# Patient Record
Sex: FEMALE | Employment: OTHER | URBAN - METROPOLITAN AREA
[De-identification: names, ages, dates, MRNs, and addresses within clinical notes are randomized per-mention and may not be internally consistent; named-entity substitution may affect disease eponyms.]

---

## 2017-01-01 ENCOUNTER — HOME CARE VISIT (OUTPATIENT)
Dept: HOME HEALTH SERVICES | Facility: HOME HEALTH | Age: 82
End: 2017-01-01
Payer: MEDICARE

## 2017-01-01 ENCOUNTER — HOME CARE VISIT (OUTPATIENT)
Dept: HOSPICE | Facility: HOSPICE | Age: 82
End: 2017-01-01
Payer: MEDICARE

## 2017-01-01 ENCOUNTER — HOME CARE VISIT (OUTPATIENT)
Dept: SCHEDULING | Facility: HOME HEALTH | Age: 82
End: 2017-01-01
Payer: MEDICARE

## 2017-01-01 ENCOUNTER — OFFICE VISIT (OUTPATIENT)
Dept: ONCOLOGY | Age: 82
End: 2017-01-01

## 2017-01-01 ENCOUNTER — HOSPITAL ENCOUNTER (INPATIENT)
Age: 82
LOS: 1 days | Discharge: HOSPICE | DRG: 871 | End: 2017-03-06
Attending: EMERGENCY MEDICINE | Admitting: INTERNAL MEDICINE
Payer: MEDICARE

## 2017-01-01 ENCOUNTER — APPOINTMENT (OUTPATIENT)
Dept: CT IMAGING | Age: 82
DRG: 871 | End: 2017-01-01
Attending: EMERGENCY MEDICINE
Payer: MEDICARE

## 2017-01-01 ENCOUNTER — APPOINTMENT (OUTPATIENT)
Dept: GENERAL RADIOLOGY | Age: 82
DRG: 871 | End: 2017-01-01
Attending: EMERGENCY MEDICINE
Payer: MEDICARE

## 2017-01-01 ENCOUNTER — HOSPICE ADMISSION (OUTPATIENT)
Dept: HOSPICE | Facility: HOSPICE | Age: 82
End: 2017-01-01
Payer: MEDICARE

## 2017-01-01 ENCOUNTER — HOSPITAL ENCOUNTER (INPATIENT)
Age: 82
LOS: 12 days | DRG: 871 | End: 2017-03-18
Attending: INTERNAL MEDICINE | Admitting: INTERNAL MEDICINE
Payer: OTHER MISCELLANEOUS

## 2017-01-01 VITALS
HEIGHT: 62 IN | RESPIRATION RATE: 16 BRPM | SYSTOLIC BLOOD PRESSURE: 142 MMHG | OXYGEN SATURATION: 94 % | HEART RATE: 74 BPM | DIASTOLIC BLOOD PRESSURE: 72 MMHG | TEMPERATURE: 97.1 F

## 2017-01-01 VITALS
SYSTOLIC BLOOD PRESSURE: 112 MMHG | TEMPERATURE: 98.4 F | DIASTOLIC BLOOD PRESSURE: 60 MMHG | RESPIRATION RATE: 14 BRPM | HEART RATE: 65 BPM | OXYGEN SATURATION: 94 %

## 2017-01-01 VITALS
DIASTOLIC BLOOD PRESSURE: 62 MMHG | HEART RATE: 76 BPM | SYSTOLIC BLOOD PRESSURE: 122 MMHG | TEMPERATURE: 98.3 F | RESPIRATION RATE: 18 BRPM

## 2017-01-01 VITALS
TEMPERATURE: 97.7 F | SYSTOLIC BLOOD PRESSURE: 122 MMHG | HEART RATE: 63 BPM | OXYGEN SATURATION: 93 % | DIASTOLIC BLOOD PRESSURE: 62 MMHG | RESPIRATION RATE: 18 BRPM

## 2017-01-01 VITALS
DIASTOLIC BLOOD PRESSURE: 70 MMHG | RESPIRATION RATE: 14 BRPM | SYSTOLIC BLOOD PRESSURE: 142 MMHG | HEART RATE: 60 BPM | OXYGEN SATURATION: 94 % | TEMPERATURE: 97.1 F

## 2017-01-01 VITALS
DIASTOLIC BLOOD PRESSURE: 72 MMHG | SYSTOLIC BLOOD PRESSURE: 130 MMHG | RESPIRATION RATE: 14 BRPM | HEART RATE: 70 BPM | OXYGEN SATURATION: 97 % | TEMPERATURE: 96.9 F

## 2017-01-01 VITALS
OXYGEN SATURATION: 95 % | DIASTOLIC BLOOD PRESSURE: 40 MMHG | WEIGHT: 117.73 LBS | BODY MASS INDEX: 21.66 KG/M2 | HEART RATE: 105 BPM | HEIGHT: 62 IN | RESPIRATION RATE: 24 BRPM | SYSTOLIC BLOOD PRESSURE: 75 MMHG | TEMPERATURE: 98.6 F

## 2017-01-01 VITALS
OXYGEN SATURATION: 89 % | DIASTOLIC BLOOD PRESSURE: 41 MMHG | RESPIRATION RATE: 38 BRPM | TEMPERATURE: 98.2 F | SYSTOLIC BLOOD PRESSURE: 77 MMHG | HEART RATE: 86 BPM

## 2017-01-01 VITALS
SYSTOLIC BLOOD PRESSURE: 108 MMHG | TEMPERATURE: 98.2 F | OXYGEN SATURATION: 95 % | RESPIRATION RATE: 16 BRPM | DIASTOLIC BLOOD PRESSURE: 50 MMHG | HEART RATE: 45 BPM

## 2017-01-01 VITALS
TEMPERATURE: 98.6 F | RESPIRATION RATE: 16 BRPM | SYSTOLIC BLOOD PRESSURE: 122 MMHG | HEART RATE: 61 BPM | OXYGEN SATURATION: 96 % | DIASTOLIC BLOOD PRESSURE: 56 MMHG

## 2017-01-01 VITALS
RESPIRATION RATE: 16 BRPM | SYSTOLIC BLOOD PRESSURE: 122 MMHG | TEMPERATURE: 96.5 F | DIASTOLIC BLOOD PRESSURE: 64 MMHG | HEART RATE: 72 BPM

## 2017-01-01 VITALS
DIASTOLIC BLOOD PRESSURE: 60 MMHG | RESPIRATION RATE: 16 BRPM | OXYGEN SATURATION: 97 % | SYSTOLIC BLOOD PRESSURE: 118 MMHG | HEART RATE: 60 BPM | TEMPERATURE: 97 F

## 2017-01-01 DIAGNOSIS — D50.0 BLOOD LOSS ANEMIA: ICD-10-CM

## 2017-01-01 DIAGNOSIS — D61.89 OTHER SPECIFIED APLASTIC ANEMIAS (HCC): Primary | ICD-10-CM

## 2017-01-01 DIAGNOSIS — E87.5 ACUTE HYPERKALEMIA: ICD-10-CM

## 2017-01-01 DIAGNOSIS — D64.9 ANEMIA, UNSPECIFIED: Primary | ICD-10-CM

## 2017-01-01 DIAGNOSIS — A41.9 SEPSIS ASSOCIATED HYPOTENSION (HCC): Primary | ICD-10-CM

## 2017-01-01 DIAGNOSIS — N39.0 URINARY TRACT INFECTION, SITE UNSPECIFIED: ICD-10-CM

## 2017-01-01 DIAGNOSIS — A41.9 SEPSIS, DUE TO UNSPECIFIED ORGANISM: ICD-10-CM

## 2017-01-01 DIAGNOSIS — N17.9 ACUTE RENAL FAILURE, UNSPECIFIED ACUTE RENAL FAILURE TYPE (HCC): ICD-10-CM

## 2017-01-01 DIAGNOSIS — N17.9 ACUTE KIDNEY INJURY (HCC): ICD-10-CM

## 2017-01-01 DIAGNOSIS — L89.152 SACRAL DECUBITUS ULCER, STAGE II (HCC): ICD-10-CM

## 2017-01-01 DIAGNOSIS — I95.9 SEPSIS ASSOCIATED HYPOTENSION (HCC): Primary | ICD-10-CM

## 2017-01-01 DIAGNOSIS — E87.20 ACIDOSIS: ICD-10-CM

## 2017-01-01 LAB
ALBUMIN SERPL BCP-MCNC: 1.4 G/DL (ref 3.5–5)
ALBUMIN SERPL BCP-MCNC: 1.6 G/DL (ref 3.5–5)
ALBUMIN/GLOB SERPL: 0.3 {RATIO} (ref 1.1–2.2)
ALBUMIN/GLOB SERPL: 0.3 {RATIO} (ref 1.1–2.2)
ALP SERPL-CCNC: 89 U/L (ref 45–117)
ALP SERPL-CCNC: 91 U/L (ref 45–117)
ALT SERPL-CCNC: 42 U/L (ref 12–78)
ALT SERPL-CCNC: 46 U/L (ref 12–78)
ANION GAP BLD CALC-SCNC: 13 MMOL/L (ref 5–15)
ANION GAP BLD CALC-SCNC: 14 MMOL/L (ref 5–15)
ANION GAP BLD CALC-SCNC: 17 MMOL/L (ref 5–15)
APPEARANCE UR: ABNORMAL
AST SERPL W P-5'-P-CCNC: 47 U/L (ref 15–37)
AST SERPL W P-5'-P-CCNC: 52 U/L (ref 15–37)
ATRIAL RATE: 54 BPM
BACTERIA SPEC CULT: ABNORMAL
BACTERIA SPEC CULT: NORMAL
BACTERIA URNS QL MICRO: ABNORMAL /HPF
BASOPHILS # BLD AUTO: 0 K/UL (ref 0–0.1)
BASOPHILS # BLD AUTO: 0 K/UL (ref 0–0.1)
BASOPHILS # BLD AUTO: 0 X10E3/UL (ref 0–0.2)
BASOPHILS # BLD AUTO: 0 X10E3/UL (ref 0–0.2)
BASOPHILS # BLD: 0 % (ref 0–1)
BASOPHILS # BLD: 0 % (ref 0–1)
BASOPHILS NFR BLD AUTO: 0 %
BASOPHILS NFR BLD AUTO: 1 %
BILIRUB SERPL-MCNC: 0.3 MG/DL (ref 0.2–1)
BILIRUB SERPL-MCNC: 0.4 MG/DL (ref 0.2–1)
BILIRUB UR QL: NEGATIVE
BUN SERPL-MCNC: 104 MG/DL (ref 6–20)
BUN SERPL-MCNC: 112 MG/DL (ref 6–20)
BUN SERPL-MCNC: 121 MG/DL (ref 6–20)
BUN/CREAT SERPL: 28 (ref 12–20)
BUN/CREAT SERPL: 30 (ref 12–20)
BUN/CREAT SERPL: 31 (ref 12–20)
CALCIUM SERPL-MCNC: 7.4 MG/DL (ref 8.5–10.1)
CALCIUM SERPL-MCNC: 7.8 MG/DL (ref 8.5–10.1)
CALCIUM SERPL-MCNC: 7.9 MG/DL (ref 8.5–10.1)
CALCULATED P AXIS, ECG09: 93 DEGREES
CALCULATED R AXIS, ECG10: -52 DEGREES
CALCULATED T AXIS, ECG11: 31 DEGREES
CC UR VC: ABNORMAL
CHLORIDE SERPL-SCNC: 104 MMOL/L (ref 97–108)
CHLORIDE SERPL-SCNC: 107 MMOL/L (ref 97–108)
CHLORIDE SERPL-SCNC: 107 MMOL/L (ref 97–108)
CK MB CFR SERPL CALC: 1.4 % (ref 0–2.5)
CK MB SERPL-MCNC: 14.6 NG/ML (ref 5–25)
CK SERPL-CCNC: 1071 U/L (ref 26–192)
CK SERPL-CCNC: 677 U/L (ref 26–192)
CO2 SERPL-SCNC: 14 MMOL/L (ref 21–32)
CO2 SERPL-SCNC: 16 MMOL/L (ref 21–32)
CO2 SERPL-SCNC: 18 MMOL/L (ref 21–32)
COLOR UR: ABNORMAL
CREAT SERPL-MCNC: 3.37 MG/DL (ref 0.55–1.02)
CREAT SERPL-MCNC: 3.71 MG/DL (ref 0.55–1.02)
CREAT SERPL-MCNC: 4.25 MG/DL (ref 0.55–1.02)
DIAGNOSIS, 93000: NORMAL
DIFFERENTIAL METHOD BLD: ABNORMAL
EOSINOPHIL # BLD AUTO: 0.1 X10E3/UL (ref 0–0.4)
EOSINOPHIL # BLD AUTO: 0.3 X10E3/UL (ref 0–0.4)
EOSINOPHIL # BLD: 0.1 K/UL (ref 0–0.4)
EOSINOPHIL # BLD: 0.2 K/UL (ref 0–0.4)
EOSINOPHIL NFR BLD AUTO: 1 %
EOSINOPHIL NFR BLD AUTO: 6 %
EOSINOPHIL NFR BLD: 1 % (ref 0–7)
EOSINOPHIL NFR BLD: 1 % (ref 0–7)
EPITH CASTS URNS QL MICRO: ABNORMAL /LPF
ERYTHROCYTE [DISTWIDTH] IN BLOOD BY AUTOMATED COUNT: 16 % (ref 12.3–15.4)
ERYTHROCYTE [DISTWIDTH] IN BLOOD BY AUTOMATED COUNT: 16 % (ref 12.3–15.4)
ERYTHROCYTE [DISTWIDTH] IN BLOOD BY AUTOMATED COUNT: 17 % (ref 11.5–14.5)
ERYTHROCYTE [DISTWIDTH] IN BLOOD BY AUTOMATED COUNT: 17.1 % (ref 11.5–14.5)
EST. AVERAGE GLUCOSE BLD GHB EST-MCNC: 123 MG/DL
GLOBULIN SER CALC-MCNC: 4.6 G/DL (ref 2–4)
GLOBULIN SER CALC-MCNC: 4.6 G/DL (ref 2–4)
GLUCOSE BLD STRIP.AUTO-MCNC: 105 MG/DL (ref 65–100)
GLUCOSE BLD STRIP.AUTO-MCNC: 126 MG/DL (ref 65–100)
GLUCOSE BLD STRIP.AUTO-MCNC: 64 MG/DL (ref 65–100)
GLUCOSE BLD STRIP.AUTO-MCNC: 68 MG/DL (ref 65–100)
GLUCOSE BLD STRIP.AUTO-MCNC: 93 MG/DL (ref 65–100)
GLUCOSE SERPL-MCNC: 109 MG/DL (ref 65–100)
GLUCOSE SERPL-MCNC: 58 MG/DL (ref 65–100)
GLUCOSE SERPL-MCNC: 90 MG/DL (ref 65–100)
GLUCOSE UR STRIP.AUTO-MCNC: NEGATIVE MG/DL
HBA1C MFR BLD: 5.9 % (ref 4.2–6.3)
HCT VFR BLD AUTO: 26.4 % (ref 35–47)
HCT VFR BLD AUTO: 26.9 % (ref 35–47)
HCT VFR BLD AUTO: 30 % (ref 34–46.6)
HCT VFR BLD AUTO: 30.9 % (ref 34–46.6)
HGB BLD-MCNC: 8.9 G/DL (ref 11.5–16)
HGB BLD-MCNC: 8.9 G/DL (ref 11.5–16)
HGB BLD-MCNC: 9.7 G/DL (ref 11.1–15.9)
HGB BLD-MCNC: 9.9 G/DL (ref 11.1–15.9)
HGB UR QL STRIP: ABNORMAL
IMM GRANULOCYTES # BLD: 0 X10E3/UL (ref 0–0.1)
IMM GRANULOCYTES # BLD: 0 X10E3/UL (ref 0–0.1)
IMM GRANULOCYTES NFR BLD: 0 %
IMM GRANULOCYTES NFR BLD: 0 %
KETONES UR QL STRIP.AUTO: NEGATIVE MG/DL
LACTATE SERPL-SCNC: 1.7 MMOL/L (ref 0.4–2)
LEUKOCYTE ESTERASE UR QL STRIP.AUTO: ABNORMAL
LYMPHOCYTES # BLD AUTO: 1.5 X10E3/UL (ref 0.7–3.1)
LYMPHOCYTES # BLD AUTO: 1.7 X10E3/UL (ref 0.7–3.1)
LYMPHOCYTES # BLD AUTO: 10 % (ref 12–49)
LYMPHOCYTES # BLD AUTO: 7 % (ref 12–49)
LYMPHOCYTES # BLD: 0.8 K/UL (ref 0.8–3.5)
LYMPHOCYTES # BLD: 1.5 K/UL (ref 0.8–3.5)
LYMPHOCYTES NFR BLD AUTO: 18 %
LYMPHOCYTES NFR BLD AUTO: 35 %
MAGNESIUM SERPL-MCNC: 2.6 MG/DL (ref 1.6–2.4)
MAGNESIUM SERPL-MCNC: 2.9 MG/DL (ref 1.6–2.4)
MCH RBC QN AUTO: 26.7 PG (ref 26–34)
MCH RBC QN AUTO: 26.9 PG (ref 26–34)
MCH RBC QN AUTO: 27.4 PG (ref 26.6–33)
MCH RBC QN AUTO: 28.5 PG (ref 26.6–33)
MCHC RBC AUTO-ENTMCNC: 31.4 G/DL (ref 31.5–35.7)
MCHC RBC AUTO-ENTMCNC: 33 G/DL (ref 31.5–35.7)
MCHC RBC AUTO-ENTMCNC: 33.1 G/DL (ref 30–36.5)
MCHC RBC AUTO-ENTMCNC: 33.7 G/DL (ref 30–36.5)
MCV RBC AUTO: 79.8 FL (ref 80–99)
MCV RBC AUTO: 80.8 FL (ref 80–99)
MCV RBC AUTO: 87 FL (ref 79–97)
MCV RBC AUTO: 87 FL (ref 79–97)
MONOCYTES # BLD AUTO: 0.4 X10E3/UL (ref 0.1–0.9)
MONOCYTES # BLD AUTO: 1.1 X10E3/UL (ref 0.1–0.9)
MONOCYTES # BLD: 0.5 K/UL (ref 0–1)
MONOCYTES # BLD: 0.8 K/UL (ref 0–1)
MONOCYTES NFR BLD AUTO: 11 %
MONOCYTES NFR BLD AUTO: 5 % (ref 5–13)
MONOCYTES NFR BLD AUTO: 5 % (ref 5–13)
MONOCYTES NFR BLD AUTO: 9 %
NEUTROPHILS # BLD AUTO: 2.2 X10E3/UL (ref 1.4–7)
NEUTROPHILS # BLD AUTO: 6.6 X10E3/UL (ref 1.4–7)
NEUTROPHILS NFR BLD AUTO: 49 %
NEUTROPHILS NFR BLD AUTO: 70 %
NEUTS SEG # BLD: 12.7 K/UL (ref 1.8–8)
NEUTS SEG # BLD: 9.8 K/UL (ref 1.8–8)
NEUTS SEG NFR BLD AUTO: 84 % (ref 32–75)
NEUTS SEG NFR BLD AUTO: 87 % (ref 32–75)
NITRITE UR QL STRIP.AUTO: NEGATIVE
P-R INTERVAL, ECG05: 214 MS
PH UR STRIP: 8 [PH] (ref 5–8)
PHOSPHATE SERPL-MCNC: 5.8 MG/DL (ref 2.6–4.7)
PLATELET # BLD AUTO: 156 X10E3/UL (ref 150–379)
PLATELET # BLD AUTO: 318 K/UL (ref 150–400)
PLATELET # BLD AUTO: 350 K/UL (ref 150–400)
PLATELET # BLD AUTO: 370 X10E3/UL (ref 150–379)
POTASSIUM SERPL-SCNC: 5.5 MMOL/L (ref 3.5–5.1)
POTASSIUM SERPL-SCNC: 5.8 MMOL/L (ref 3.5–5.1)
POTASSIUM SERPL-SCNC: 6.5 MMOL/L (ref 3.5–5.1)
PROT SERPL-MCNC: 6 G/DL (ref 6.4–8.2)
PROT SERPL-MCNC: 6.2 G/DL (ref 6.4–8.2)
PROT UR STRIP-MCNC: 100 MG/DL
Q-T INTERVAL, ECG07: 476 MS
QRS DURATION, ECG06: 94 MS
QTC CALCULATION (BEZET), ECG08: 451 MS
RBC # BLD AUTO: 3.31 M/UL (ref 3.8–5.2)
RBC # BLD AUTO: 3.33 M/UL (ref 3.8–5.2)
RBC # BLD AUTO: 3.47 X10E6/UL (ref 3.77–5.28)
RBC # BLD AUTO: 3.54 X10E6/UL (ref 3.77–5.28)
RBC #/AREA URNS HPF: ABNORMAL /HPF (ref 0–5)
RBC MORPH BLD: ABNORMAL
RBC MORPH BLD: ABNORMAL
SERVICE CMNT-IMP: ABNORMAL
SERVICE CMNT-IMP: NORMAL
SODIUM SERPL-SCNC: 132 MMOL/L (ref 136–145)
SODIUM SERPL-SCNC: 138 MMOL/L (ref 136–145)
SODIUM SERPL-SCNC: 140 MMOL/L (ref 136–145)
SP GR UR REFRACTOMETRY: 1.01 (ref 1–1.03)
TROPONIN I SERPL-MCNC: 0.04 NG/ML
TROPONIN I SERPL-MCNC: 0.04 NG/ML
TROPONIN I SERPL-MCNC: 0.09 NG/ML
TSH SERPL DL<=0.05 MIU/L-ACNC: 0.93 UIU/ML (ref 0.36–3.74)
UA: UC IF INDICATED,UAUC: ABNORMAL
UROBILINOGEN UR QL STRIP.AUTO: 0.2 EU/DL (ref 0.2–1)
VENTRICULAR RATE, ECG03: 54 BPM
WBC # BLD AUTO: 11.2 K/UL (ref 3.6–11)
WBC # BLD AUTO: 15.2 K/UL (ref 3.6–11)
WBC # BLD AUTO: 4.4 X10E3/UL (ref 3.4–10.8)
WBC # BLD AUTO: 9.4 X10E3/UL (ref 3.4–10.8)
WBC URNS QL MICRO: >100 /HPF (ref 0–4)

## 2017-01-01 PROCEDURE — 74011250637 HC RX REV CODE- 250/637: Performed by: INTERNAL MEDICINE

## 2017-01-01 PROCEDURE — G0299 HHS/HOSPICE OF RN EA 15 MIN: HCPCS

## 2017-01-01 PROCEDURE — 0651 HSPC ROUTINE HOME CARE

## 2017-01-01 PROCEDURE — 77030019607 HC DSG BURN S&N -A

## 2017-01-01 PROCEDURE — 87186 SC STD MICRODIL/AGAR DIL: CPT | Performed by: EMERGENCY MEDICINE

## 2017-01-01 PROCEDURE — 84443 ASSAY THYROID STIM HORMONE: CPT | Performed by: INTERNAL MEDICINE

## 2017-01-01 PROCEDURE — 65270000015 HC RM PRIVATE ONCOLOGY

## 2017-01-01 PROCEDURE — 74011250636 HC RX REV CODE- 250/636: Performed by: INTERNAL MEDICINE

## 2017-01-01 PROCEDURE — 74011000250 HC RX REV CODE- 250: Performed by: INTERNAL MEDICINE

## 2017-01-01 PROCEDURE — L4396 STATIC OR DYNAMI AFO PRE CST: HCPCS

## 2017-01-01 PROCEDURE — 70450 CT HEAD/BRAIN W/O DYE: CPT

## 2017-01-01 PROCEDURE — G0300 HHS/HOSPICE OF LPN EA 15 MIN: HCPCS

## 2017-01-01 PROCEDURE — 65660000000 HC RM CCU STEPDOWN

## 2017-01-01 PROCEDURE — 80048 BASIC METABOLIC PNL TOTAL CA: CPT | Performed by: INTERNAL MEDICINE

## 2017-01-01 PROCEDURE — 84100 ASSAY OF PHOSPHORUS: CPT | Performed by: INTERNAL MEDICINE

## 2017-01-01 PROCEDURE — 83036 HEMOGLOBIN GLYCOSYLATED A1C: CPT | Performed by: INTERNAL MEDICINE

## 2017-01-01 PROCEDURE — 83605 ASSAY OF LACTIC ACID: CPT | Performed by: EMERGENCY MEDICINE

## 2017-01-01 PROCEDURE — 74011000258 HC RX REV CODE- 258: Performed by: INTERNAL MEDICINE

## 2017-01-01 PROCEDURE — 0656 HSPC GENERAL INPATIENT

## 2017-01-01 PROCEDURE — 74011250636 HC RX REV CODE- 250/636: Performed by: HOSPITALIST

## 2017-01-01 PROCEDURE — 96361 HYDRATE IV INFUSION ADD-ON: CPT

## 2017-01-01 PROCEDURE — G0157 HHC PT ASSISTANT EA 15: HCPCS

## 2017-01-01 PROCEDURE — 81001 URINALYSIS AUTO W/SCOPE: CPT | Performed by: EMERGENCY MEDICINE

## 2017-01-01 PROCEDURE — 83735 ASSAY OF MAGNESIUM: CPT | Performed by: EMERGENCY MEDICINE

## 2017-01-01 PROCEDURE — 83735 ASSAY OF MAGNESIUM: CPT | Performed by: INTERNAL MEDICINE

## 2017-01-01 PROCEDURE — 87040 BLOOD CULTURE FOR BACTERIA: CPT | Performed by: EMERGENCY MEDICINE

## 2017-01-01 PROCEDURE — 77030033263 HC DRSG MEPILEX 16-48IN BORD MOLN -B

## 2017-01-01 PROCEDURE — 93005 ELECTROCARDIOGRAM TRACING: CPT

## 2017-01-01 PROCEDURE — 77030011256 HC DRSG MEPILEX <16IN NO BORD MOLN -A

## 2017-01-01 PROCEDURE — 74011250636 HC RX REV CODE- 250/636

## 2017-01-01 PROCEDURE — 84484 ASSAY OF TROPONIN QUANT: CPT | Performed by: INTERNAL MEDICINE

## 2017-01-01 PROCEDURE — 99285 EMERGENCY DEPT VISIT HI MDM: CPT

## 2017-01-01 PROCEDURE — 80053 COMPREHEN METABOLIC PANEL: CPT | Performed by: INTERNAL MEDICINE

## 2017-01-01 PROCEDURE — 3336500001 HSPC ELECTION

## 2017-01-01 PROCEDURE — 87086 URINE CULTURE/COLONY COUNT: CPT | Performed by: EMERGENCY MEDICINE

## 2017-01-01 PROCEDURE — 36415 COLL VENOUS BLD VENIPUNCTURE: CPT | Performed by: INTERNAL MEDICINE

## 2017-01-01 PROCEDURE — 74011000258 HC RX REV CODE- 258: Performed by: EMERGENCY MEDICINE

## 2017-01-01 PROCEDURE — 71010 XR CHEST PORT: CPT

## 2017-01-01 PROCEDURE — 87077 CULTURE AEROBIC IDENTIFY: CPT | Performed by: EMERGENCY MEDICINE

## 2017-01-01 PROCEDURE — 74011250636 HC RX REV CODE- 250/636: Performed by: EMERGENCY MEDICINE

## 2017-01-01 PROCEDURE — 82962 GLUCOSE BLOOD TEST: CPT

## 2017-01-01 PROCEDURE — 96365 THER/PROPH/DIAG IV INF INIT: CPT

## 2017-01-01 PROCEDURE — 85025 COMPLETE CBC W/AUTO DIFF WBC: CPT | Performed by: INTERNAL MEDICINE

## 2017-01-01 PROCEDURE — G0155 HHCP-SVS OF CSW,EA 15 MIN: HCPCS

## 2017-01-01 PROCEDURE — G0151 HHCP-SERV OF PT,EA 15 MIN: HCPCS

## 2017-01-01 PROCEDURE — 82550 ASSAY OF CK (CPK): CPT | Performed by: INTERNAL MEDICINE

## 2017-01-01 PROCEDURE — 36415 COLL VENOUS BLD VENIPUNCTURE: CPT | Performed by: EMERGENCY MEDICINE

## 2017-01-01 PROCEDURE — 82550 ASSAY OF CK (CPK): CPT | Performed by: EMERGENCY MEDICINE

## 2017-01-01 PROCEDURE — 84484 ASSAY OF TROPONIN QUANT: CPT | Performed by: EMERGENCY MEDICINE

## 2017-01-01 PROCEDURE — 85025 COMPLETE CBC W/AUTO DIFF WBC: CPT | Performed by: EMERGENCY MEDICINE

## 2017-01-01 PROCEDURE — 80053 COMPREHEN METABOLIC PANEL: CPT | Performed by: EMERGENCY MEDICINE

## 2017-01-01 PROCEDURE — 74011636637 HC RX REV CODE- 636/637: Performed by: INTERNAL MEDICINE

## 2017-01-01 RX ORDER — DOPAMINE HYDROCHLORIDE 320 MG/100ML
5 INJECTION, SOLUTION INTRAVENOUS CONTINUOUS
Status: DISCONTINUED | OUTPATIENT
Start: 2017-01-01 | End: 2017-01-01 | Stop reason: HOSPADM

## 2017-01-01 RX ORDER — MORPHINE SULFATE 20 MG/ML
2 SOLUTION ORAL
Status: DISCONTINUED | OUTPATIENT
Start: 2017-01-01 | End: 2017-01-01 | Stop reason: HOSPADM

## 2017-01-01 RX ORDER — CEFTRIAXONE 1 G/1
INJECTION, POWDER, FOR SOLUTION INTRAMUSCULAR; INTRAVENOUS
Status: COMPLETED
Start: 2017-01-01 | End: 2017-01-01

## 2017-01-01 RX ORDER — LORAZEPAM 2 MG/ML
1 CONCENTRATE ORAL EVERY 4 HOURS
Status: DISCONTINUED | OUTPATIENT
Start: 2017-01-01 | End: 2017-01-01

## 2017-01-01 RX ORDER — SODIUM BICARBONATE 1 MEQ/ML
50 SYRINGE (ML) INTRAVENOUS ONCE
Status: COMPLETED | OUTPATIENT
Start: 2017-01-01 | End: 2017-01-01

## 2017-01-01 RX ORDER — ONDANSETRON 2 MG/ML
4 INJECTION INTRAMUSCULAR; INTRAVENOUS
Status: DISCONTINUED | OUTPATIENT
Start: 2017-01-01 | End: 2017-01-01 | Stop reason: HOSPADM

## 2017-01-01 RX ORDER — LORAZEPAM 2 MG/ML
0.5 INJECTION INTRAMUSCULAR
Status: DISCONTINUED | OUTPATIENT
Start: 2017-01-01 | End: 2017-01-01

## 2017-01-01 RX ORDER — FACIAL-BODY WIPES
10 EACH TOPICAL DAILY PRN
Status: DISCONTINUED | OUTPATIENT
Start: 2017-01-01 | End: 2017-01-01 | Stop reason: HOSPADM

## 2017-01-01 RX ORDER — SODIUM CHLORIDE 0.9 % (FLUSH) 0.9 %
5-10 SYRINGE (ML) INJECTION EVERY 8 HOURS
Status: DISCONTINUED | OUTPATIENT
Start: 2017-01-01 | End: 2017-01-01 | Stop reason: HOSPADM

## 2017-01-01 RX ORDER — MORPHINE SULFATE 2 MG/ML
2 INJECTION, SOLUTION INTRAMUSCULAR; INTRAVENOUS
Status: DISCONTINUED | OUTPATIENT
Start: 2017-01-01 | End: 2017-01-01 | Stop reason: HOSPADM

## 2017-01-01 RX ORDER — SODIUM CHLORIDE 9 MG/ML
75 INJECTION, SOLUTION INTRAVENOUS CONTINUOUS
Status: DISCONTINUED | OUTPATIENT
Start: 2017-01-01 | End: 2017-01-01

## 2017-01-01 RX ORDER — HEPARIN SODIUM 5000 [USP'U]/ML
5000 INJECTION, SOLUTION INTRAVENOUS; SUBCUTANEOUS EVERY 12 HOURS
Status: DISCONTINUED | OUTPATIENT
Start: 2017-01-01 | End: 2017-01-01 | Stop reason: HOSPADM

## 2017-01-01 RX ORDER — MORPHINE SULFATE 20 MG/ML
5 SOLUTION ORAL
Status: DISCONTINUED | OUTPATIENT
Start: 2017-01-01 | End: 2017-01-01 | Stop reason: HOSPADM

## 2017-01-01 RX ORDER — LORAZEPAM 2 MG/ML
1 CONCENTRATE ORAL
Status: DISCONTINUED | OUTPATIENT
Start: 2017-01-01 | End: 2017-01-01 | Stop reason: HOSPADM

## 2017-01-01 RX ORDER — DEXTROSE 50 % IN WATER (D50W) INTRAVENOUS SYRINGE
12.5-25 AS NEEDED
Status: DISCONTINUED | OUTPATIENT
Start: 2017-01-01 | End: 2017-01-01 | Stop reason: HOSPADM

## 2017-01-01 RX ORDER — ATROPINE SULFATE 10 MG/ML
3 SOLUTION/ DROPS OPHTHALMIC
Status: DISCONTINUED | OUTPATIENT
Start: 2017-01-01 | End: 2017-01-01 | Stop reason: HOSPADM

## 2017-01-01 RX ORDER — VANCOMYCIN/0.9 % SOD CHLORIDE 1.5G/250ML
1500 PLASTIC BAG, INJECTION (ML) INTRAVENOUS ONCE
Status: COMPLETED | OUTPATIENT
Start: 2017-01-01 | End: 2017-01-01

## 2017-01-01 RX ORDER — CALCIUM GLUCONATE 94 MG/ML
1 INJECTION, SOLUTION INTRAVENOUS ONCE
Status: COMPLETED | OUTPATIENT
Start: 2017-01-01 | End: 2017-01-01

## 2017-01-01 RX ORDER — SCOLOPAMINE TRANSDERMAL SYSTEM 1 MG/1
1.5 PATCH, EXTENDED RELEASE TRANSDERMAL
Status: DISCONTINUED | OUTPATIENT
Start: 2017-01-01 | End: 2017-01-01 | Stop reason: HOSPADM

## 2017-01-01 RX ORDER — DEXTROSE 50 % IN WATER (D50W) INTRAVENOUS SYRINGE
25 ONCE
Status: COMPLETED | OUTPATIENT
Start: 2017-01-01 | End: 2017-01-01

## 2017-01-01 RX ORDER — MORPHINE SULFATE 2 MG/ML
2 INJECTION, SOLUTION INTRAMUSCULAR; INTRAVENOUS
Status: DISCONTINUED | OUTPATIENT
Start: 2017-01-01 | End: 2017-01-01

## 2017-01-01 RX ORDER — GLYCOPYRROLATE 0.2 MG/ML
0.2 INJECTION INTRAMUSCULAR; INTRAVENOUS
Status: DISCONTINUED | OUTPATIENT
Start: 2017-01-01 | End: 2017-01-01

## 2017-01-01 RX ORDER — LORAZEPAM 2 MG/ML
0.5 CONCENTRATE ORAL
Status: DISCONTINUED | OUTPATIENT
Start: 2017-01-01 | End: 2017-01-01 | Stop reason: HOSPADM

## 2017-01-01 RX ORDER — MORPHINE SULFATE 20 MG/ML
2 SOLUTION ORAL EVERY 4 HOURS
Status: DISCONTINUED | OUTPATIENT
Start: 2017-01-01 | End: 2017-01-01

## 2017-01-01 RX ORDER — MAGNESIUM SULFATE 100 %
4 CRYSTALS MISCELLANEOUS AS NEEDED
Status: DISCONTINUED | OUTPATIENT
Start: 2017-01-01 | End: 2017-01-01 | Stop reason: HOSPADM

## 2017-01-01 RX ORDER — INSULIN LISPRO 100 [IU]/ML
INJECTION, SOLUTION INTRAVENOUS; SUBCUTANEOUS EVERY 6 HOURS
Status: DISCONTINUED | OUTPATIENT
Start: 2017-01-01 | End: 2017-01-01 | Stop reason: HOSPADM

## 2017-01-01 RX ORDER — ACETAMINOPHEN 650 MG/1
650 SUPPOSITORY RECTAL
Status: DISCONTINUED | OUTPATIENT
Start: 2017-01-01 | End: 2017-01-01 | Stop reason: HOSPADM

## 2017-01-01 RX ORDER — MORPHINE SULFATE 2 MG/ML
2 INJECTION, SOLUTION INTRAMUSCULAR; INTRAVENOUS EVERY 4 HOURS
Status: DISCONTINUED | OUTPATIENT
Start: 2017-01-01 | End: 2017-01-01

## 2017-01-01 RX ORDER — LORAZEPAM 2 MG/ML
1 INJECTION INTRAMUSCULAR EVERY 4 HOURS
Status: DISCONTINUED | OUTPATIENT
Start: 2017-01-01 | End: 2017-01-01

## 2017-01-01 RX ORDER — SODIUM CHLORIDE 0.9 % (FLUSH) 0.9 %
5-10 SYRINGE (ML) INJECTION AS NEEDED
Status: DISCONTINUED | OUTPATIENT
Start: 2017-01-01 | End: 2017-01-01 | Stop reason: HOSPADM

## 2017-01-01 RX ORDER — HEPARIN SODIUM 5000 [USP'U]/ML
5000 INJECTION, SOLUTION INTRAVENOUS; SUBCUTANEOUS EVERY 8 HOURS
Status: DISCONTINUED | OUTPATIENT
Start: 2017-01-01 | End: 2017-01-01

## 2017-01-01 RX ORDER — MORPHINE SULFATE 20 MG/ML
2 SOLUTION ORAL
Status: DISCONTINUED | OUTPATIENT
Start: 2017-01-01 | End: 2017-01-01

## 2017-01-01 RX ADMIN — MORPHINE SULFATE 5 MG: 20 SOLUTION ORAL at 20:58

## 2017-01-01 RX ADMIN — SODIUM CHLORIDE 75 ML/HR: 900 INJECTION, SOLUTION INTRAVENOUS at 16:15

## 2017-01-01 RX ADMIN — LORAZEPAM 0.5 MG: 2 INJECTION INTRAMUSCULAR; INTRAVENOUS at 02:39

## 2017-01-01 RX ADMIN — WATER: 1000 INJECTION, SOLUTION INTRAVENOUS at 05:02

## 2017-01-01 RX ADMIN — GLYCOPYRROLATE 0.2 MG: 0.2 INJECTION, SOLUTION INTRAMUSCULAR; INTRAVENOUS at 06:00

## 2017-01-01 RX ADMIN — Medication 2 MG: at 06:00

## 2017-01-01 RX ADMIN — MORPHINE SULFATE 5 MG: 20 SOLUTION ORAL at 08:28

## 2017-01-01 RX ADMIN — MORPHINE SULFATE 5 MG: 20 SOLUTION ORAL at 10:10

## 2017-01-01 RX ADMIN — LORAZEPAM 1 MG: 2 SOLUTION, CONCENTRATE ORAL at 00:11

## 2017-01-01 RX ADMIN — MORPHINE SULFATE 5 MG: 20 SOLUTION ORAL at 20:36

## 2017-01-01 RX ADMIN — LORAZEPAM 1 MG: 2 SOLUTION, CONCENTRATE ORAL at 11:49

## 2017-01-01 RX ADMIN — LORAZEPAM 1 MG: 2 SOLUTION, CONCENTRATE ORAL at 02:50

## 2017-01-01 RX ADMIN — LORAZEPAM 1 MG: 2 SOLUTION, CONCENTRATE ORAL at 03:14

## 2017-01-01 RX ADMIN — MORPHINE SULFATE 5 MG: 20 SOLUTION ORAL at 15:35

## 2017-01-01 RX ADMIN — GLYCOPYRROLATE 0.2 MG: 0.2 INJECTION, SOLUTION INTRAMUSCULAR; INTRAVENOUS at 04:44

## 2017-01-01 RX ADMIN — LORAZEPAM 1 MG: 2 SOLUTION, CONCENTRATE ORAL at 06:28

## 2017-01-01 RX ADMIN — LORAZEPAM 1 MG: 2 SOLUTION, CONCENTRATE ORAL at 05:11

## 2017-01-01 RX ADMIN — HEPARIN SODIUM 5000 UNITS: 5000 INJECTION, SOLUTION INTRAVENOUS; SUBCUTANEOUS at 05:05

## 2017-01-01 RX ADMIN — GLYCOPYRROLATE 0.2 MG: 0.2 INJECTION, SOLUTION INTRAMUSCULAR; INTRAVENOUS at 21:38

## 2017-01-01 RX ADMIN — MORPHINE SULFATE 5 MG: 20 SOLUTION ORAL at 05:11

## 2017-01-01 RX ADMIN — MORPHINE SULFATE 5 MG: 20 SOLUTION ORAL at 11:55

## 2017-01-01 RX ADMIN — Medication 2 MG: at 21:39

## 2017-01-01 RX ADMIN — LORAZEPAM 1 MG: 2 SOLUTION, CONCENTRATE ORAL at 06:43

## 2017-01-01 RX ADMIN — VANCOMYCIN HYDROCHLORIDE 1500 MG: 10 INJECTION, POWDER, LYOPHILIZED, FOR SOLUTION INTRAVENOUS at 22:30

## 2017-01-01 RX ADMIN — LORAZEPAM 1 MG: 2 SOLUTION, CONCENTRATE ORAL at 23:30

## 2017-01-01 RX ADMIN — LORAZEPAM 1 MG: 2 SOLUTION, CONCENTRATE ORAL at 11:08

## 2017-01-01 RX ADMIN — LORAZEPAM 1 MG: 2 SOLUTION, CONCENTRATE ORAL at 12:24

## 2017-01-01 RX ADMIN — WATER: 1000 INJECTION, SOLUTION INTRAVENOUS at 19:16

## 2017-01-01 RX ADMIN — DEXTROSE MONOHYDRATE 25 G: 25 INJECTION, SOLUTION INTRAVENOUS at 18:44

## 2017-01-01 RX ADMIN — LORAZEPAM 0.5 MG: 2 INJECTION INTRAMUSCULAR; INTRAVENOUS at 03:39

## 2017-01-01 RX ADMIN — LORAZEPAM 1 MG: 2 INJECTION INTRAMUSCULAR; INTRAVENOUS at 14:33

## 2017-01-01 RX ADMIN — LORAZEPAM 1 MG: 2 SOLUTION, CONCENTRATE ORAL at 11:55

## 2017-01-01 RX ADMIN — LORAZEPAM 0.5 MG: 2 INJECTION INTRAMUSCULAR; INTRAVENOUS at 10:15

## 2017-01-01 RX ADMIN — CEFTRIAXONE 1 G: 1 INJECTION, POWDER, FOR SOLUTION INTRAMUSCULAR; INTRAVENOUS at 19:10

## 2017-01-01 RX ADMIN — DEXTROSE MONOHYDRATE 12.5 G: 25 INJECTION, SOLUTION INTRAVENOUS at 05:25

## 2017-01-01 RX ADMIN — LORAZEPAM 1 MG: 2 SOLUTION, CONCENTRATE ORAL at 21:50

## 2017-01-01 RX ADMIN — MORPHINE SULFATE 5 MG: 20 SOLUTION ORAL at 23:24

## 2017-01-01 RX ADMIN — LORAZEPAM 1 MG: 2 SOLUTION, CONCENTRATE ORAL at 15:36

## 2017-01-01 RX ADMIN — LORAZEPAM 1 MG: 2 SOLUTION, CONCENTRATE ORAL at 17:58

## 2017-01-01 RX ADMIN — Medication 2 MG: at 04:43

## 2017-01-01 RX ADMIN — MORPHINE SULFATE 5 MG: 20 SOLUTION ORAL at 22:02

## 2017-01-01 RX ADMIN — MORPHINE SULFATE 5 MG: 20 SOLUTION ORAL at 17:58

## 2017-01-01 RX ADMIN — LORAZEPAM 1 MG: 2 SOLUTION, CONCENTRATE ORAL at 09:52

## 2017-01-01 RX ADMIN — MORPHINE SULFATE 5 MG: 20 SOLUTION ORAL at 09:37

## 2017-01-01 RX ADMIN — MORPHINE SULFATE 5 MG: 20 SOLUTION ORAL at 02:50

## 2017-01-01 RX ADMIN — Medication 2 MG: at 10:15

## 2017-01-01 RX ADMIN — LORAZEPAM 1 MG: 2 SOLUTION, CONCENTRATE ORAL at 02:02

## 2017-01-01 RX ADMIN — MORPHINE SULFATE 2 MG: 20 SOLUTION ORAL at 13:20

## 2017-01-01 RX ADMIN — MORPHINE SULFATE 5 MG: 20 SOLUTION ORAL at 11:23

## 2017-01-01 RX ADMIN — LORAZEPAM 1 MG: 2 SOLUTION, CONCENTRATE ORAL at 17:26

## 2017-01-01 RX ADMIN — ATROPINE SULFATE 3 DROP: 10 SOLUTION/ DROPS OPHTHALMIC at 09:02

## 2017-01-01 RX ADMIN — MORPHINE SULFATE 5 MG: 20 SOLUTION ORAL at 23:30

## 2017-01-01 RX ADMIN — MORPHINE SULFATE 5 MG: 20 SOLUTION ORAL at 21:48

## 2017-01-01 RX ADMIN — MORPHINE SULFATE 5 MG: 20 SOLUTION ORAL at 18:15

## 2017-01-01 RX ADMIN — LORAZEPAM 1 MG: 2 SOLUTION, CONCENTRATE ORAL at 03:53

## 2017-01-01 RX ADMIN — MORPHINE SULFATE 5 MG: 20 SOLUTION ORAL at 05:04

## 2017-01-01 RX ADMIN — Medication 2 MG: at 17:56

## 2017-01-01 RX ADMIN — LORAZEPAM 1 MG: 2 SOLUTION, CONCENTRATE ORAL at 08:28

## 2017-01-01 RX ADMIN — MORPHINE SULFATE 5 MG: 20 SOLUTION ORAL at 17:25

## 2017-01-01 RX ADMIN — SODIUM CHLORIDE 1000 ML: 900 INJECTION, SOLUTION INTRAVENOUS at 18:35

## 2017-01-01 RX ADMIN — SODIUM CHLORIDE 500 ML: 900 INJECTION, SOLUTION INTRAVENOUS at 16:15

## 2017-01-01 RX ADMIN — MORPHINE SULFATE 5 MG: 20 SOLUTION ORAL at 02:02

## 2017-01-01 RX ADMIN — LORAZEPAM 1 MG: 2 SOLUTION, CONCENTRATE ORAL at 09:01

## 2017-01-01 RX ADMIN — HEPARIN SODIUM 5000 UNITS: 5000 INJECTION, SOLUTION INTRAVENOUS; SUBCUTANEOUS at 18:36

## 2017-01-01 RX ADMIN — Medication 2 MG: at 20:43

## 2017-01-01 RX ADMIN — MORPHINE SULFATE 5 MG: 20 SOLUTION ORAL at 01:11

## 2017-01-01 RX ADMIN — MORPHINE SULFATE 5 MG: 20 SOLUTION ORAL at 20:51

## 2017-01-01 RX ADMIN — LORAZEPAM 0.5 MG: 2 INJECTION INTRAMUSCULAR; INTRAVENOUS at 11:07

## 2017-01-01 RX ADMIN — MORPHINE SULFATE 5 MG: 20 SOLUTION ORAL at 00:11

## 2017-01-01 RX ADMIN — LORAZEPAM 1 MG: 2 SOLUTION, CONCENTRATE ORAL at 05:50

## 2017-01-01 RX ADMIN — LORAZEPAM 1 MG: 2 SOLUTION, CONCENTRATE ORAL at 17:04

## 2017-01-01 RX ADMIN — MORPHINE SULFATE 5 MG: 20 SOLUTION ORAL at 03:53

## 2017-01-01 RX ADMIN — MORPHINE SULFATE 5 MG: 20 SOLUTION ORAL at 11:08

## 2017-01-01 RX ADMIN — MORPHINE SULFATE 5 MG: 20 SOLUTION ORAL at 03:13

## 2017-01-01 RX ADMIN — LORAZEPAM 0.5 MG: 2 INJECTION INTRAMUSCULAR; INTRAVENOUS at 23:38

## 2017-01-01 RX ADMIN — MORPHINE SULFATE 5 MG: 20 SOLUTION ORAL at 03:14

## 2017-01-01 RX ADMIN — MORPHINE SULFATE 5 MG: 20 SOLUTION ORAL at 20:11

## 2017-01-01 RX ADMIN — MORPHINE SULFATE 5 MG: 20 SOLUTION ORAL at 03:00

## 2017-01-01 RX ADMIN — Medication 2 MG: at 08:24

## 2017-01-01 RX ADMIN — MORPHINE SULFATE 5 MG: 20 SOLUTION ORAL at 09:02

## 2017-01-01 RX ADMIN — MORPHINE SULFATE 5 MG: 20 SOLUTION ORAL at 17:05

## 2017-01-01 RX ADMIN — LORAZEPAM 1 MG: 2 INJECTION INTRAMUSCULAR; INTRAVENOUS at 17:56

## 2017-01-01 RX ADMIN — MORPHINE SULFATE 5 MG: 20 SOLUTION ORAL at 12:23

## 2017-01-01 RX ADMIN — MORPHINE SULFATE 5 MG: 20 SOLUTION ORAL at 11:48

## 2017-01-01 RX ADMIN — MORPHINE SULFATE 5 MG: 20 SOLUTION ORAL at 09:00

## 2017-01-01 RX ADMIN — MORPHINE SULFATE 5 MG: 20 SOLUTION ORAL at 06:43

## 2017-01-01 RX ADMIN — LORAZEPAM 1 MG: 2 SOLUTION, CONCENTRATE ORAL at 11:23

## 2017-01-01 RX ADMIN — LORAZEPAM 1 MG: 2 SOLUTION, CONCENTRATE ORAL at 23:57

## 2017-01-01 RX ADMIN — GLYCOPYRROLATE 0.2 MG: 0.2 INJECTION, SOLUTION INTRAMUSCULAR; INTRAVENOUS at 20:43

## 2017-01-01 RX ADMIN — LORAZEPAM 1 MG: 2 SOLUTION, CONCENTRATE ORAL at 06:25

## 2017-01-01 RX ADMIN — LORAZEPAM 1 MG: 2 SOLUTION, CONCENTRATE ORAL at 20:36

## 2017-01-01 RX ADMIN — MORPHINE SULFATE 5 MG: 20 SOLUTION ORAL at 05:55

## 2017-01-01 RX ADMIN — CALCIUM GLUCONATE 1 G: 94 INJECTION, SOLUTION INTRAVENOUS at 18:30

## 2017-01-01 RX ADMIN — SODIUM BICARBONATE 50 MEQ: 84 INJECTION, SOLUTION INTRAVENOUS at 20:33

## 2017-01-01 RX ADMIN — MORPHINE SULFATE 5 MG: 20 SOLUTION ORAL at 23:56

## 2017-01-01 RX ADMIN — Medication 2 MG: at 14:33

## 2017-01-01 RX ADMIN — LORAZEPAM 1 MG: 2 SOLUTION, CONCENTRATE ORAL at 18:15

## 2017-01-01 RX ADMIN — Medication 2 MG: at 15:28

## 2017-01-01 RX ADMIN — LORAZEPAM 1 MG: 2 SOLUTION, CONCENTRATE ORAL at 22:02

## 2017-01-01 RX ADMIN — Medication 10 ML: at 20:34

## 2017-01-01 RX ADMIN — LORAZEPAM 1 MG: 2 SOLUTION, CONCENTRATE ORAL at 13:19

## 2017-01-01 RX ADMIN — LORAZEPAM 1 MG: 2 SOLUTION, CONCENTRATE ORAL at 18:02

## 2017-01-01 RX ADMIN — MORPHINE SULFATE 5 MG: 20 SOLUTION ORAL at 06:28

## 2017-01-01 RX ADMIN — INSULIN HUMAN 10 UNITS: 100 INJECTION, SOLUTION PARENTERAL at 18:35

## 2017-01-01 RX ADMIN — MORPHINE SULFATE 5 MG: 20 SOLUTION ORAL at 18:02

## 2017-01-01 RX ADMIN — LORAZEPAM 1 MG: 2 SOLUTION, CONCENTRATE ORAL at 01:11

## 2017-01-01 RX ADMIN — LORAZEPAM 1 MG: 2 SOLUTION, CONCENTRATE ORAL at 20:51

## 2017-01-01 RX ADMIN — LORAZEPAM 1 MG: 2 SOLUTION, CONCENTRATE ORAL at 23:25

## 2017-01-01 RX ADMIN — LORAZEPAM 1 MG: 2 SOLUTION, CONCENTRATE ORAL at 20:11

## 2017-01-01 RX ADMIN — LORAZEPAM 1 MG: 2 SOLUTION, CONCENTRATE ORAL at 10:10

## 2017-01-01 RX ADMIN — LORAZEPAM 1 MG: 2 SOLUTION, CONCENTRATE ORAL at 05:55

## 2017-01-01 RX ADMIN — LORAZEPAM 1 MG: 2 SOLUTION, CONCENTRATE ORAL at 03:00

## 2017-01-01 RX ADMIN — MORPHINE SULFATE 5 MG: 20 SOLUTION ORAL at 06:25

## 2017-01-01 RX ADMIN — CEFTRIAXONE SODIUM: 1 INJECTION, POWDER, FOR SOLUTION INTRAMUSCULAR; INTRAVENOUS at 19:04

## 2017-01-01 RX ADMIN — LORAZEPAM 1 MG: 2 SOLUTION, CONCENTRATE ORAL at 15:35

## 2017-01-01 RX ADMIN — MORPHINE SULFATE 2 MG: 20 SOLUTION ORAL at 09:50

## 2017-01-01 RX ADMIN — LORAZEPAM 1 MG: 2 SOLUTION, CONCENTRATE ORAL at 05:04

## 2017-01-01 RX ADMIN — Medication 2 MG: at 18:00

## 2017-01-01 RX ADMIN — DOPAMINE HYDROCHLORIDE IN DEXTROSE 5 MCG/KG/MIN: 3.2 INJECTION, SOLUTION INTRAVENOUS at 18:45

## 2017-01-01 RX ADMIN — MORPHINE SULFATE 5 MG: 20 SOLUTION ORAL at 05:50

## 2017-01-01 RX ADMIN — LORAZEPAM 1 MG: 2 SOLUTION, CONCENTRATE ORAL at 08:50

## 2017-01-01 RX ADMIN — LORAZEPAM 1 MG: 2 SOLUTION, CONCENTRATE ORAL at 03:13

## 2017-01-01 RX ADMIN — Medication 10 ML: at 05:03

## 2017-01-01 RX ADMIN — CEFTRIAXONE 1 G: 1 INJECTION, POWDER, FOR SOLUTION INTRAMUSCULAR; INTRAVENOUS at 17:13

## 2017-01-01 RX ADMIN — LORAZEPAM 1 MG: 2 SOLUTION, CONCENTRATE ORAL at 20:58

## 2017-01-01 RX ADMIN — SODIUM CHLORIDE 1000 ML: 900 INJECTION, SOLUTION INTRAVENOUS at 09:00

## 2017-01-01 RX ADMIN — SODIUM CHLORIDE 250 ML: 900 INJECTION, SOLUTION INTRAVENOUS at 07:00

## 2017-01-16 NOTE — PROGRESS NOTES
Meri Szymanski is a 80 y.o. female here for to establish care had concerns including Anemia and New Patient.

## 2017-01-16 NOTE — PATIENT INSTRUCTIONS
We will plan to check your blood counts monthly. This will be done outpatient at [facility].    Please call our office prior to arriving at [facility] and we will fax an order to them.

## 2017-01-17 NOTE — PROGRESS NOTES
Hematology Consultation        Patient: Denice Nageotte MRN: [id number]  SSN: [id number]    YOB: 1926  Age: 80 y.o. Sex: female      Subjective:      Denice Nageotte is a 80 y.o. female who I am seeing in consultation on request of Dr. Pacheco Che for anemia. She was admitted to the hospital with severe anemia and received RBC transfusion. The number improved and she was discharged home. Detailed laboratory investigation did not reveal a clear cause of anemia. She has significant dementia and is unable to answer any questions. She is accompanied by her family members who she lives with. Review of Systems:    Patient is unable to give a ROS      Past Medical History   Diagnosis Date    Dementia     Hypertension      History reviewed. No pertinent past surgical history. History reviewed. No pertinent family history. Social History   Substance Use Topics    Smoking status: Former Smoker    Smokeless tobacco: Not on file    Alcohol use No      Prior to Admission medications    Medication Sig Start Date End Date Taking? Authorizing Provider   allopurinol (ZYLOPRIM) 100 mg tablet Take 100 mg by mouth daily. Yes Historical Provider   folic acid (FOLVITE) 1 mg tablet Take 1 mg by mouth daily. Yes Historical Provider   gabapentin (NEURONTIN) 300 mg capsule Take 300 mg by mouth two (2) times a day. Yes Historical Provider   losartan (COZAAR) 50 mg tablet Take 50 mg by mouth daily. Yes Historical Provider   simvastatin (ZOCOR) 20 mg tablet Take 20 mg by mouth nightly.    Yes Historical Provider              No Known Allergies        Objective:     Vitals:    01/16/17 1406   BP: 142/72   Pulse: 74   Resp: 16   Temp: 97.1 °F (36.2 °C)   TempSrc: Axillary   SpO2: 94%   Height: 5' 2\" (1.575 m)            Physical Exam:    GENERAL: alert, not able to answer question or follow commands  EYE: negative  LYMPHATIC: Cervical, supraclavicular, and axillary nodes normal.   THROAT & NECK: normal and no erythema or exudates noted. LUNG: clear to auscultation bilaterally, limited exam due to lumbar lardosis and inability to co-operate with the examination  HEART: regular rate and rhythm  ABDOMEN: soft, non-tender  EXTREMITIES: no cyanosis or edema  SKIN: Normal.  NEUROLOGIC: negative           Lab Results   Component Value Date/Time    WBC 7.8 12/27/2016 04:08 AM    HGB 8.2 12/28/2016 05:27 AM    HCT 24.5 12/28/2016 05:27 AM    PLATELET 483 26/27/9019 04:08 AM    MCV 90.8 12/27/2016 04:08 AM           Assessment:     1. Anemia    Normocytic  Due to chronic disease and renal insufficiency  ? Bleeding cannot be ruled out. She is too frail and elderly for detailed evaluation including colonoscopy. I recommended conservative approach. We shall check CBC monthly and transfuse as needed. I discussed this with the family and seem satisfied with the plan. Plan:       1. CBC check monthly  2. RBC transfusion when Hgb < 7 gm        Signed by: Tyra Mcneil MD                     January 17, 2017         CC.  Stephen Montiel MD

## 2017-03-05 PROBLEM — K72.00 SHOCK LIVER: Status: ACTIVE | Noted: 2017-01-01

## 2017-03-05 PROBLEM — N39.0 UTI (URINARY TRACT INFECTION): Status: ACTIVE | Noted: 2017-01-01

## 2017-03-05 PROBLEM — A41.9 SEPTIC SHOCK (HCC): Status: ACTIVE | Noted: 2017-01-01

## 2017-03-05 PROBLEM — R65.21 SEPTIC SHOCK (HCC): Status: ACTIVE | Noted: 2017-01-01

## 2017-03-05 PROBLEM — N17.9 ARF (ACUTE RENAL FAILURE) (HCC): Status: ACTIVE | Noted: 2017-01-01

## 2017-03-05 PROBLEM — E87.20 ACIDOSIS: Status: ACTIVE | Noted: 2017-01-01

## 2017-03-05 NOTE — H&P
Hospitalist Admission Note    NAME: Denice Nageotte   :  1926   MRN:  [id number]     Date/Time:  3/5/2017 6:09 PM    Patient PCP: Mason Moore MD  ________________________________________________________________________    My assessment of this patient's clinical condition and my plan of care is as follows. Assessment / Plan:  Sepsis POA/Septic Shock: patient is DNR family does not want heroic interventions but wants treatment even though mortality is very high, will start IVF with bicarbonate and low dose dopamine. Encephalopathy: due to acute disease and dementia, monitor. Acidosis: start bicarbonate drip, monitor. UTI: start CTX/Vancomycin, f/u cultures. ARF: due to septic shock, will start bicarbonate drip and monitor, get Renal evaluation. Hyperkalemia: start Calcium, Bicarbonate and Insulin/D50, monitor. Shock Liver: hold statin, c/w IVF and monitor. Rhabdomyolysis: c/w Bicarbonate drip, monitor CK. Pressure Ulcers POA: get wound care. Dementia: patient is lethargic, is bed bound developing pressure ulcers, no good quality of life. Hyperlipidemia: now CK is high, hold statin, monitor. H/O DM?: place SSI, check HbA1C. Code Status: DNR  Surrogate Decision Maker: Lenora Noelle [phone] or [phone] [facility] [id number]  DVT Prophylaxis: Heparin  GI Prophylaxis: not indicated  Baseline: Bed bound, advanced Dementia    Critically ill, Prognosis is poor discussed with DTR        Subjective:   CHIEF COMPLAINT: Demented unable to provide a meaningful history    HISTORY OF PRESENT ILLNESS:     Denice Nageotte is a 80 y.o.  female  with PMHx significant for DM / HTN who [facility] [address] [facility] for evaluation of change in mental status x today. According to family, patients was last known well yesterday. Per EMS, family reports that pt began to show a decline in mental status starting at 1300 today, with pt becoming unresponsive ~1400.  Family returned home from [place], found pt in altered state and then called EMS. When EMS arrived to scene, pt was in wheelchair. According to EMS, pt had temperature of 93.1, BP of 73/36 that brigid to 122/74 after 200 cc's of fluids, and a blood glucose of 147 en route to ED. EMS notes that pt was aroused to painful stimuli. Per EMS, family reports that pt ate lunch today. At this time patient is lethargic, demented can not provide a meaningful history, data collected from chart and DTR in the room. We were asked to admit for work up and evaluation of the above problems. Past Medical History:   Diagnosis Date    Dementia     Hypertension         History reviewed. No pertinent surgical history. Social History   Substance Use Topics    Smoking status: Former Smoker    Smokeless tobacco: Not on file    Alcohol use No        Family History   Problem Relation Age of Onset    Heart Disease Mother     Cancer Father      No Known Allergies     Prior to Admission medications    Medication Sig Start Date End Date Taking? Authorizing Provider   allopurinol (ZYLOPRIM) 100 mg tablet Take 100 mg by mouth daily. Historical Provider   folic acid (FOLVITE) 1 mg tablet Take 1 mg by mouth daily. Historical Provider   gabapentin (NEURONTIN) 300 mg capsule Take 300 mg by mouth two (2) times a day. Historical Provider   losartan (COZAAR) 50 mg tablet Take 50 mg by mouth daily. Historical Provider   simvastatin (ZOCOR) 20 mg tablet Take 20 mg by mouth nightly. Historical Provider       REVIEW OF SYSTEMS:     I am not able to complete the review of systems because:    The patient is intubated and sedated    The patient has altered mental status due to his acute medical problems    The patient has baseline aphasia from prior stroke(s)   y The patient has baseline dementia and is not reliable historian    The patient is in acute medical distress and unable to provide information           Total of 12 systems reviewed as follows:       POSITIVE= underlined text  Negative = text not underlined  General:  fever, chills, sweats, generalized weakness, weight loss/gain,      loss of appetite   Eyes:    blurred vision, eye pain, loss of vision, double vision  ENT:    rhinorrhea, pharyngitis   Respiratory:   cough, sputum production, SOB, LOPEZ, wheezing, pleuritic pain   Cardiology:   chest pain, palpitations, orthopnea, PND, edema, syncope   Gastrointestinal:  abdominal pain , N/V, diarrhea, dysphagia, constipation, bleeding   Genitourinary:  frequency, urgency, dysuria, hematuria, incontinence   Muskuloskeletal :  arthralgia, myalgia, back pain  Hematology:  easy bruising, nose or gum bleeding, lymphadenopathy   Dermatological: rash, ulceration, pruritis, color change / jaundice  Endocrine:   hot flashes or polydipsia   Neurological:  headache, dizziness, confusion, focal weakness, paresthesia,     Speech difficulties, memory loss, gait difficulty  Psychological: Feelings of anxiety, depression, agitation    Objective:   VITALS:    Visit Vitals    BP (!) 81/33    Pulse (!) 54    Temp 97.2 °F (36.2 °C)    Resp 21    Wt 53.4 kg (117 lb 11.6 oz)    SpO2 94%    BMI 21.53 kg/m2       PHYSICAL EXAM:    General:    Lethargic, demented, no distress, appears stated age. HEENT: Atraumatic, anicteric sclerae, pink conjunctivae     No oral ulcers, mucosa dry, throat clear, dentition poor  Neck:  Supple, symmetrical,  thyroid: non tender  Lungs:   Coarse BS. No Wheezing or Rhonchi. No rales. Chest wall:  No tenderness  No Accessory muscle use. Heart:   Regular  rhythm,  No  murmur   No edema  Abdomen:   Soft, non-tender. Not distended. Bowel sounds normal  Extremities: No cyanosis. No clubbing      Capillary refill normal,  Radial pulse 2+  Skin:     Not pale. Not Jaundiced  No rashes , pressure ulcers in hip, sacrum, heel? Psych:  Poor  insight. Not depressed. Not anxious or agitated.   Neurologic: Lethargic, demented, GCs [id number], contracted limbs    _______________________________________________________________________  Care Plan discussed with:    Comments   Patient y    Family  y    RN y    Care Manager                    Consultant:  neyda RAYMOND   _______________________________________________________________________  Expected  Disposition:   Home with Family    HH/PT/OT/RN    SNF/LTC y   [de-identified]    ________________________________________________________________________  TOTAL TIME:   Minutes    Critical Care Provided  72   Minutes non procedure based      Comments    y Reviewed previous records   >50% of visit spent in counseling and coordination of care y Discussion with patient and/or family and questions answered       ________________________________________________________________________  Signed: Yasir Rlole MD    Procedures: see electronic medical records for all procedures/Xrays and details which were not copied into this note but were reviewed prior to creation of Plan. LAB DATA REVIEWED:    Recent Results (from the past 24 hour(s))   CBC WITH AUTOMATED DIFF    Collection Time: 03/05/17  3:56 PM   Result Value Ref Range    WBC 15.2 (H) 3.6 - 11.0 K/uL    RBC 3.33 (L) 3.80 - 5.20 M/uL    HGB 8.9 (L) 11.5 - 16.0 g/dL    HCT 26.9 (L) 35.0 - 47.0 %    MCV 80.8 80.0 - 99.0 FL    MCH 26.7 26.0 - 34.0 PG    MCHC 33.1 30.0 - 36.5 g/dL    RDW 17.1 (H) 11.5 - 14.5 %    PLATELET 925 446 - 362 K/uL    NEUTROPHILS 84 (H) 32 - 75 %    LYMPHOCYTES 10 (L) 12 - 49 %    MONOCYTES 5 5 - 13 %    EOSINOPHILS 1 0 - 7 %    BASOPHILS 0 0 - 1 %    ABS. NEUTROPHILS 12.7 (H) 1.8 - 8.0 K/UL    ABS. LYMPHOCYTES 1.5 0.8 - 3.5 K/UL    ABS. MONOCYTES 0.8 0.0 - 1.0 K/UL    ABS. EOSINOPHILS 0.2 0.0 - 0.4 K/UL    ABS.  BASOPHILS 0.0 0.0 - 0.1 K/UL    DF SMEAR SCANNED      RBC COMMENTS NADIYA CELLS  1+        RBC COMMENTS ANISOCYTOSIS  1+       CK W/ CKMB & INDEX    Collection Time: 03/05/17  3:56 PM   Result Value Ref Range    CK 1071 (H) 26 - 192 U/L    CK - MB 14.6 (H) <3.6 NG/ML    CK-MB Index 1.4 0 - 2.5     METABOLIC PANEL, COMPREHENSIVE    Collection Time: 03/05/17  3:56 PM   Result Value Ref Range    Sodium 132 (L) 136 - 145 mmol/L    Potassium 6.5 (H) 3.5 - 5.1 mmol/L    Chloride 104 97 - 108 mmol/L    CO2 14 (LL) 21 - 32 mmol/L    Anion gap 14 5 - 15 mmol/L    Glucose 109 (H) 65 - 100 mg/dL     (H) 6 - 20 MG/DL    Creatinine 4.25 (H) 0.55 - 1.02 MG/DL    BUN/Creatinine ratio 28 (H) 12 - 20      GFR est AA 12 (L) >60 ml/min/1.73m2    GFR est non-AA 10 (L) >60 ml/min/1.73m2    Calcium 7.9 (L) 8.5 - 10.1 MG/DL    Bilirubin, total 0.3 0.2 - 1.0 MG/DL    ALT (SGPT) 46 12 - 78 U/L    AST (SGOT) 52 (H) 15 - 37 U/L    Alk.  phosphatase 89 45 - 117 U/L    Protein, total 6.2 (L) 6.4 - 8.2 g/dL    Albumin 1.6 (L) 3.5 - 5.0 g/dL    Globulin 4.6 (H) 2.0 - 4.0 g/dL    A-G Ratio 0.3 (L) 1.1 - 2.2     TROPONIN I    Collection Time: 03/05/17  3:56 PM   Result Value Ref Range    Troponin-I, Qt. 0.04 <0.05 ng/mL   MAGNESIUM    Collection Time: 03/05/17  3:56 PM   Result Value Ref Range    Magnesium 2.9 (H) 1.6 - 2.4 mg/dL   LACTIC ACID, PLASMA    Collection Time: 03/05/17  3:56 PM   Result Value Ref Range    Lactic acid 1.7 0.4 - 2.0 MMOL/L   URINALYSIS W/ REFLEX CULTURE    Collection Time: 03/05/17  3:56 PM   Result Value Ref Range    Color DARK YELLOW      Appearance (A) CLEAR       Macroscopic performed on spun urine due to gross blood  or mucus    Specific gravity 1.010 1.003 - 1.030      pH (UA) 8.0 5.0 - 8.0      Protein 100 (A) NEG mg/dL    Glucose NEGATIVE  NEG mg/dL    Ketone NEGATIVE  NEG mg/dL    Bilirubin NEGATIVE  NEG      Blood TRACE (A) NEG      Urobilinogen 0.2 0.2 - 1.0 EU/dL    Nitrites NEGATIVE  NEG      Leukocyte Esterase LARGE (A) NEG      WBC >100 (H) 0 - 4 /hpf    RBC 5-10 0 - 5 /hpf    Epithelial cells FEW FEW /lpf    Bacteria 4+ (A) NEG /hpf    UA:UC IF INDICATED URINE CULTURE ORDERED (A) CNI

## 2017-03-05 NOTE — PROGRESS NOTES
Pharmacy Automatic Renal Dosing Protocol - Antimicrobials      Indication for Antimicrobials: UTI, Septic Shock  Current Regimen of Each Antimicrobial (Start Day & Day of Therapy):  Ceftriaxone 2 grams Q24H started 3/5 day 1)  Vancomycin (started 3/5 day 1)    Significant cultures:   3/5 UA 4+ bacteria, leuk large, nitrites negative      CAPD, Intermittent Hemodialysis or Renal Replacement Therapy: NA  Paralysis, amputations, malnutrition: No  Recent Labs      17   1556   CREA  4.25*   BUN  121*   WBC  15.2*     Temp (24hrs), Av.2 °F (36.2 °C), Min:97.2 °F (36.2 °C), Max:97.2 °F (36.2 °C)    Creatinine Clearance (ml/min): 7 ml/min      Goal Vancomycin Level(s): 10-15      Impression/Plan: Vancomycin 1500 mg loading dose then 500 mg Q48H with an anticipated trough of 15.8 mcg/ml. Pharmacy will follow daily and adjust doses of monitored medications as appropriate for renal function and/or serum levels.     Thank you,  Viola Ramírez, [facility]

## 2017-03-05 NOTE — ED NOTES
1500- Patient arrives EMS with c/o AMS. Patient has had generalized weakness over past two weeks and family noticed significant change today. Patient responds to pain with snoring like respirations. Nasal trumpet in place. Crackles auscultated in upper airways. Multiple pressure ulcers. Patient incontinent of stool, cleansed, and dressing applied to sacral pressure wound. Patient straight cathed for urine with assistance. Patient taken to CT with monitor and RN. Patient DNR.     1600- Patient continues to be hypotensive. Dr. Tim Cruz aware. Chaplain kelly Winters will respond.

## 2017-03-05 NOTE — PROGRESS NOTES
Spiritual Care Assessment/Progress Notes    Adamarissie Halsted [id number]  [id number]    5/24/1926  80 y.o.  female    Patient Telephone Number: [phone] (home)   Temple Affiliation: No Hinduism   Language: English   Extended Emergency Contact Information  [facility] Phone: [phone]  Relation: Daughter   Patient Active Problem List    Diagnosis Date Noted    Blood loss anemia 12/23/2016    Counseling regarding advanced care planning and goals of care 12/23/2016    Weakness generalized 12/23/2016    Debility 12/23/2016    Alzheimer's disease 12/23/2016    Multiple falls 12/23/2016        Date: 3/5/2017       Level of Temple/Spiritual Activity:  [x]         Involved in chau tradition/spiritual practice    []         Not involved in chau tradition/spiritual practice  [x]         Spiritually oriented    []         Claims no spiritual orientation    []         seeking spiritual identity  []         Feels alienated from Spiritism practice/tradition  []         Feels angry about Spiritism practice/tradition  [x]         Spirituality/Spiritism tradition is a resource for coping at this time.   []         Not able to assess due to medical condition    Services Provided Today:  []         crisis intervention    []         reading Scriptures  [x]         spiritual assessment    []         prayer  [x]         empathic listening/emotional support  []         rites and rituals (cite in comments)  [x]         life review     []         Spiritism support  []         theological development   []         advocacy  []         ethical dialog     []         blessing  []         bereavement support    [x]         support to family  []         anticipatory grief support   []         help with AMD  []         spiritual guidance    []         meditation      Spiritual Care Needs  []         Emotional Support  []         Spiritual/Temple Care  [] Loss/Adjustment  []         Advocacy/Referral                /Ethics  [x]         No needs expressed at               this time  []         Other: (note in               comments)  Spiritual Care Plan  []         Follow up visits with               pt/family  []         Provide materials  []         Schedule sacraments  []         Contact Community               Clergy  [x]         Follow up as needed  []         Other: (note in               comments)     Comments:  requested by RN to support pt's family. I had met this pt and family from a previous admit. Pt was awake but seemed unresponsive to . Pt's daughter Lele Tinsley and son Christopher Bateman were bedside. Pt's son had left in the middle of the visit. 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 They seemed well supported by their churches as well as supporting each other and getting support from other family members in the area. Pt's daughter gave a life review of the pt and who she was as a mother. Daughter shared that even though she expected this (due to the age of pt), the situation is still hard and the daughter is trying to deal with it the best she can.  advised them of availability and assurance of continued emotional and spiritual support if/ when needed. RODOLFO Hill.S.   Spiritual Care Department  If need arise please call [phone])

## 2017-03-05 NOTE — ED PROVIDER NOTES
HPI Comments: Sierra Romero is a 80 y.o. F with PMHx significant for DM / HTN who presents via EMS to ED [facility] ED for evaluation of change in mental status x today. According to family, patients was last known well yesterday. Per EMS, family reports that pt began to show a decline in mental status starting at 1300 today, with pt becoming unresponsive ~1400. Family returned home from [employer], found pt in altered state and then called EMS. When EMS arrived to scene, pt was in wheelchair. According to EMS, pt had temperature of 93.1, BP of 73/36 that brigid to 122/74 after 200 cc's of fluids, and a blood glucose of 147 en route to ED. EMS notes that pt was aroused to painful stimuli. Per EMS, family reports that pt ate lunch today. PCP: Lai Banegas MD    Patient's history is limited secondary to change in mental status. The history is provided by the EMS personnel. Past Medical History:   Diagnosis Date    Dementia     Hypertension        No past surgical history on file. No family history on file. Social History     Social History    Marital status: UNKNOWN     Spouse name: N/A    Number of children: N/A    Years of education: N/A     Occupational History    Not on file. Social History Main Topics    Smoking status: Former Smoker    Smokeless tobacco: Not on file    Alcohol use No    Drug use: No    Sexual activity: Not on file     Other Topics Concern    Not on file     Social History Narrative         ALLERGIES: Review of patient's allergies indicates no known allergies.     Review of Systems   Unable to perform ROS: Mental status change     Patient Vitals for the past 12 hrs:   Temp Pulse Resp BP SpO2   03/05/17 1646 - (!) 54 21 (!) 81/33 94 %   03/05/17 1630 - (!) 54 21 (!) 74/30 -   03/05/17 1615 - (!) 56 19 (!) 65/23 -   03/05/17 1600 - 61 24 (!) 58/27 -   03/05/17 1554 - 65 19 (!) 53/33 -   03/05/17 1512 97.2 °F (36.2 °C) 67 22 (!) 89/56 -            Physical Exam Constitutional: She appears well-developed and well-nourished. No distress. Obtunded  Not responsive to verbal stimuli  Responsive to painful stimuli   HENT:   Head: Normocephalic and atraumatic. Mouth/Throat: Oropharynx is clear and moist.   Eyes: Conjunctivae and EOM are normal. Pupils are equal, round, and reactive to light. No scleral icterus. Neck: Normal range of motion. Neck supple. Cardiovascular: Normal rate, regular rhythm and normal heart sounds. Exam reveals no gallop. No murmur heard. Pulmonary/Chest: Effort normal. No stridor. No respiratory distress. She has no wheezes. She has rhonchi (BL in bases of lungs). She has no rales. Abdominal: Soft. Normal appearance and bowel sounds are normal. She exhibits no distension and no mass. There is no tenderness. There is no rebound and no guarding. Musculoskeletal: Normal range of motion. She exhibits edema. 2+ edema in BL lower extremities    Lymphadenopathy:     She has no cervical adenopathy. Neurological: No cranial nerve deficit. Spontaneously moving all four extremities    Skin: Skin is warm and dry. No rash noted. Left lateral heel has a large fluid filled blister  Stage I pressure sore on right lateral knee  Stage II sacral decubitus ulcer   Psychiatric: She has a normal mood and affect. Nursing note and vitals reviewed. MDM  Number of Diagnoses or Management Options  Acute hyperkalemia:   Acute kidney injury [facility]):   Sacral decubitus ulcer, stage II:   Sepsis associated hypotension ([facility] [address]):   Urinary tract infection, site unspecified:   Diagnosis management comments: DDx: ICH, CVA, Metabolic abnormality, Sepsis, Aspiration pneumonia.         Amount and/or Complexity of Data Reviewed  Clinical lab tests: ordered and reviewed  Tests in the radiology section of CPT®: ordered and reviewed  Tests in the medicine section of CPT®: ordered and reviewed  Obtain history from someone other than the patient: yes (EMS)  Review and summarize past medical records: yes  Discuss the patient with other providers: yes (Kayla Barnett )  Independent visualization of images, tracings, or specimens: yes    Critical Care  Total time providing critical care: 30-74 minutes    Patient Progress  Patient progress: stable    ED Course       Procedures     EKG interpretation: (Preliminary)  Rhythm: sinus bradycardia; and regular . Rate (approx.): 54; Axis: left axis deviation; P wave: prolonged; QRS interval: normal ; ST/T wave: non-specific changes; Other findings: borderline ekg. PROGRESS NOTE:  3:35 PM  Spoke to family, said that pt did not go to [employer] today. Family came home form [employer] and found her less responsive per her baseline. Yesterday pt was acting more normal. At baseline pt is not very active and mostly stays in bed. Occasionally pt will speak a few random words, but is mostly non-verbal.  Written by Nael Castro. Kathy Dominique, ED Scribe, as dictated by Thalia Boxer, MD    PROGRESS NOTE:  4:01 PM  Pt reevaluated. Pt has BP in the low 50's, will give 500 cc bolus. Written by Nael Castro. Kathy Dominique, ED Scribe, as dictated by Thalia Boxer, MD    4:45 PM  Pt is uroseptic, though with normal lactate. Hypotensive; aggressively hydrating. She is also hyperkalemic and is in acute renal failure. She is slightly more responsive than on admission, but remains critically ill. Will admit to hospitalist. Pt is DNR; this was confirmed with family. Lesly Cruz MD    CONSULT NOTE:   5:03 PM  Thalia Boxer, MD spoke with Dr. Kayla Barnett,   Specialty: Hospitalist  Discussed pt's hx, disposition, and available diagnostic and imaging results. Reviewed care plans. Consultant will accept pt for admission. Written by Nael Castro.  Kathy Dominique, ED Scribe, as dictated by Thalia Boxer, MD.    CRITICAL CARE NOTE :  5:09 PM    IMPENDING DETERIORATION -Cardiovascular, CNS, Metabolic and Renal  ASSOCIATED RISK FACTORS - Hypotension, Shock, Metabolic changes, Dehydration and Vascular Compromise  MANAGEMENT- Bedside Assessment  INTERPRETATION -  Xrays, CT Scan, ECG, Blood Pressure and Cardiac Output Measures   INTERVENTIONS - vascular control  CASE REVIEW - Hospitalist, Nursing and Family  TREATMENT RESPONSE -Stable  PERFORMED BY - Self    NOTES   :  I have spent 55 minutes of critical care time involved in lab review, consultations with specialist, family decision- making, bedside attention and documentation. During this entire length of time I was immediately available to the patient . Mau Irving MD    LABORATORY TESTS:  Recent Results (from the past 12 hour(s))   CBC WITH AUTOMATED DIFF    Collection Time: 03/05/17  3:56 PM   Result Value Ref Range    WBC 15.2 (H) 3.6 - 11.0 K/uL    RBC 3.33 (L) 3.80 - 5.20 M/uL    HGB 8.9 (L) 11.5 - 16.0 g/dL    HCT 26.9 (L) 35.0 - 47.0 %    MCV 80.8 80.0 - 99.0 FL    MCH 26.7 26.0 - 34.0 PG    MCHC 33.1 30.0 - 36.5 g/dL    RDW 17.1 (H) 11.5 - 14.5 %    PLATELET 869 126 - 568 K/uL    NEUTROPHILS 84 (H) 32 - 75 %    LYMPHOCYTES 10 (L) 12 - 49 %    MONOCYTES 5 5 - 13 %    EOSINOPHILS 1 0 - 7 %    BASOPHILS 0 0 - 1 %    ABS. NEUTROPHILS 12.7 (H) 1.8 - 8.0 K/UL    ABS. LYMPHOCYTES 1.5 0.8 - 3.5 K/UL    ABS. MONOCYTES 0.8 0.0 - 1.0 K/UL    ABS. EOSINOPHILS 0.2 0.0 - 0.4 K/UL    ABS.  BASOPHILS 0.0 0.0 - 0.1 K/UL    DF SMEAR SCANNED      RBC COMMENTS NADIYA CELLS  1+        RBC COMMENTS ANISOCYTOSIS  1+       CK W/ CKMB & INDEX    Collection Time: 03/05/17  3:56 PM   Result Value Ref Range    CK 1071 (H) 26 - 192 U/L    CK - MB 14.6 (H) <3.6 NG/ML    CK-MB Index 1.4 0 - 2.5     METABOLIC PANEL, COMPREHENSIVE    Collection Time: 03/05/17  3:56 PM   Result Value Ref Range    Sodium 132 (L) 136 - 145 mmol/L    Potassium 6.5 (H) 3.5 - 5.1 mmol/L    Chloride 104 97 - 108 mmol/L    CO2 14 (LL) 21 - 32 mmol/L    Anion gap 14 5 - 15 mmol/L    Glucose 109 (H) 65 - 100 mg/dL     (H) 6 - 20 MG/DL    Creatinine 4.25 (H) 0.55 - 1.02 MG/DL    BUN/Creatinine ratio 28 (H) 12 - 20      GFR est AA 12 (L) >60 ml/min/1.73m2    GFR est non-AA 10 (L) >60 ml/min/1.73m2    Calcium 7.9 (L) 8.5 - 10.1 MG/DL    Bilirubin, total 0.3 0.2 - 1.0 MG/DL    ALT (SGPT) 46 12 - 78 U/L    AST (SGOT) 52 (H) 15 - 37 U/L    Alk. phosphatase 89 45 - 117 U/L    Protein, total 6.2 (L) 6.4 - 8.2 g/dL    Albumin 1.6 (L) 3.5 - 5.0 g/dL    Globulin 4.6 (H) 2.0 - 4.0 g/dL    A-G Ratio 0.3 (L) 1.1 - 2.2     TROPONIN I    Collection Time: 03/05/17  3:56 PM   Result Value Ref Range    Troponin-I, Qt. 0.04 <0.05 ng/mL   MAGNESIUM    Collection Time: 03/05/17  3:56 PM   Result Value Ref Range    Magnesium 2.9 (H) 1.6 - 2.4 mg/dL   LACTIC ACID, PLASMA    Collection Time: 03/05/17  3:56 PM   Result Value Ref Range    Lactic acid 1.7 0.4 - 2.0 MMOL/L   URINALYSIS W/ REFLEX CULTURE    Collection Time: 03/05/17  3:56 PM   Result Value Ref Range    Color DARK YELLOW      Appearance (A) CLEAR       Macroscopic performed on spun urine due to gross blood  or mucus    Specific gravity 1.010 1.003 - 1.030      pH (UA) 8.0 5.0 - 8.0      Protein 100 (A) NEG mg/dL    Glucose NEGATIVE  NEG mg/dL    Ketone NEGATIVE  NEG mg/dL    Bilirubin NEGATIVE  NEG      Blood TRACE (A) NEG      Urobilinogen 0.2 0.2 - 1.0 EU/dL    Nitrites NEGATIVE  NEG      Leukocyte Esterase LARGE (A) NEG      WBC >100 (H) 0 - 4 /hpf    RBC 5-10 0 - 5 /hpf    Epithelial cells FEW FEW /lpf    Bacteria 4+ (A) NEG /hpf    UA:UC IF INDICATED URINE CULTURE ORDERED (A) CNI         IMAGING RESULTS:  CT HEAD WO CONT   Final Result   EXAM: CT HEAD WO CONT     INDICATION: AMS, H/O dementia, R/O bleed     COMPARISON: December 23, 2016.     TECHNIQUE: Unenhanced CT of the head was performed using 5 mm images. Brain and  bone windows were generated. CT dose reduction was achieved through use of a  standardized protocol tailored for this examination and automatic exposure  control for dose modulation.      FINDINGS:  Atrophy and white matter disease.  No extra-axial fluid collection hemorrhage or  shift. Incidental opacification of both maxillary sinus.     IMPRESSION  impression: No acute intracranial findings. XR CHEST PORT   Final Result   Clinical indication: Chest pain.     Portable AP semierect view of the chest is obtained, comparison December 23, 2016. The heart size is normal. Rotation artifact. No focal infiltrate.     IMPRESSION  impression: No acute changes. MEDICATIONS GIVEN:  Medications   0.9% sodium chloride infusion (75 mL/hr IntraVENous New Bag 3/5/17 1615)   cefTRIAXone (ROCEPHIN) 1 g in 0.9% sodium chloride (MBP/ADV) 50 mL (1 g IntraVENous New Bag 3/5/17 1713)   sodium chloride 0.9 % bolus infusion 500 mL (500 mL IntraVENous New Bag 3/5/17 1615)       IMPRESSION:  1. Sepsis associated hypotension ([address]    2. Urinary tract infection, site unspecified    3. Acute kidney injury ([address]    4. Acute hyperkalemia    5. Sacral decubitus ulcer, stage II        PLAN:  1. Admit to hospital    5:24 PM  Patient is being admitted to the hospital by Dr. Amy Krishnamurthy. The results of their tests and reasons for their admission have been discussed with them and/or available family. They convey agreement and understanding for the need to be admitted and for their admission diagnosis. Consultation has been made with the inpatient physician specialist for hospitalization. Written by Ken Antony. Sunitha Lemus, ED Scribe, as dictated by Ness Charles MD.      Attestations: This note is prepared by Ken Antony. Fritz, acting as Scribe for Ness Charles MD.    Ness Charles MD: The scribe's documentation has been prepared under my direction and personally reviewed by me in its entirety. I confirm that the note above accurately reflects all work, treatment, procedures, and medical decision making performed by me.

## 2017-03-06 PROBLEM — A41.9 SEPSIS (HCC): Status: ACTIVE | Noted: 2017-01-01

## 2017-03-06 NOTE — DISCHARGE SUMMARY
Hospitalist Discharge Summary     Patient ID:  Sergey Amato  [id number]  89 y.o.  5/24/1926    PCP on record: Cheo Diana MD    Admit date: 3/5/2017  Discharge date and time: 3/6/2017      DISCHARGE DIAGNOSIS:  Dementia  Pressure ulcers  Septic shock   jimbo on ckd  Metabolic acidosis  hyperkalemia        CONSULTATIONS:  IP CONSULT TO NEPHROLOGY    Excerpted HPI from H&P of Monika Pringle MD:  Sergey Amato is a 80 y.o.  female  with PMHx significant for DM / HTN who presents via EMS to [facility] ED for evaluation of change in mental status x today. According to family, patients was last known well yesterday. Per EMS, family reports that pt began to show a decline in mental status starting at 1300 today, with pt becoming unresponsive ~1400. Family returned home from [place], found pt in altered state and then called EMS. When EMS arrived to scene, pt was in wheelchair. According to EMS, pt had temperature of 93.1, BP of 73/36 that brigid to 122/74 after 200 cc's of fluids, and a blood glucose of 147 en route to ED. EMS notes that pt was aroused to painful stimuli. Per EMS, family reports that pt ate lunch today. At this time patient is lethargic, demented can not provide a meaningful history, data collected from chart and DTR in the room. We were asked to admit for work up and evaluation of the above problems.        ______________________________________________________________________  DISCHARGE SUMMARY/HOSPITAL COURSE:  for full details see H&P, daily progress notes, labs, consult notes. Septic shock with jimbo on ckd, and metabolic acidosis, hyperkalemia,   With UTI  Encephalopathy with underlying dementia     On aggressive volume resuscitation and pressors and antibiotics  Had the family meeting, poor prognosis, would not survive this admission,Over all all would be appropriate for hospice.  Discussed dementia, worsening, pressure ulcers, declining functional status  Not eating and drinking, natural way of progression    Hospice met with the family,   Plan for inpatient hospice       Dementia  Pressure ulcers        Hyperlipidemia  Holding statin given the elevated lft  hba1c 5.9         _______________________________________________________________________  See the progress note written tody    Total time in minutes spent coordinating this discharge (includes going over instructions, follow-up, prescriptions, and preparing report for sign off to her PCP) :35 minutes    Signed:  Ryanne Vazquez MD

## 2017-03-06 NOTE — HOSPICE
[facility] LCSW Note: This LCSW met with patient and her family, daughter Reece Hernandez, son Christina Lynn and niece Oren Castro (daughter of patient's  daughter,  about 4 years ago). Patient is unresponsive, appears very comfortable at this time, has some shallow breathing which is normal at this stage of life. Family notes that patient remains comfortable unless she is moved as she has pressure sores. Family shared that they are \"ready for patient to meet her maker, she is ready too\". Patient lived with a daughter, not present today. She was admitted to the hospital yesterday after becoming unresponsive at home. Karen Wood shared that patient ate some food and then \"her head just slumped down\". Patient was hospitalized in  and had become much weaker since that time. She was no longer able to walk, was not as interactive as she usually was and \"had no quality of life\". Family describe patient as a very loving, giving person and a great cook. She is noted to have taken in others in the neighborhood \"who had nowhere to go\". 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 Patient is admitted as comfort bed/ hospice for a diagnosis of Sepsis. Dr. Estrada Pastor will be following her.  arrangements most likely to be via [facility] support. [facility] Help to Those in Need  [phone]    Social Work Admission Note  Patient Name: Bea Mcconnell  YOB: 1926  Age: 80 y.o.     Date of Visit: 17  Facility of Care: [facility]  Patient Room: 2249/     Hospice Attending: Danielle Avalos MD  Hospice Diagnosis: hospice  Sepsis [facility])    Level of Care:    []  GIP    []  Respite   [x]  Routine    NARRATIVE   See above    ADVANCE CARE PLANNING    Code Status: DNR  Durable DNR: _ Yes  X_ No  Advance Care Planning 3/5/2017   Patient's Healthcare Decision Maker is: Legal Next of Nina 69   Primary Decision Maker Name Reece Hernandez   Primary Decision Maker Phone Number [phone]   Primary Decision Maker Relationship to Patient Adult child   Secondary Decision Maker Name Douglas Bonilla   Secondary Decision Maker Phone Number [phone]   Secondary Decision Maker Relationship to Patient Adult child   Confirm Advance Directive Yes, on file       Relationship Status:  []  Single     []        []      []  Domestic Partner     [x]  /  []  Common Law  []    []  Unknown    If in a relationship, name of partner/spouse:  Duration of relationship:    Scientology: NO Lutheran     Home: [facility]  Resources Provided: Information about our bereavement services. Social Work Initial Assessment     Gender:  female    Race/Ethnicity: (angelia all that apply)  []  American [place] ([facility]) or [place] Native  []    [x]  Black or [place] American  []   or   []  Native [address] or Carol  []  Bryan Sierra  []  Unknown      Service:    []  Yes   [x]  No       []  Unknown  Appropriate for Pinning Ceremony:   []  Yes      []  No  Is patient using VA benefits?    []  Yes      []  No     Primary Language: English  []   Needed  []   utilized during visit    Ability to express thoughts/needs/feelings  []  Expressed thoughts/feelings/needs without difficulty  []  Requires extra time and cuing  []  Speech limited single words  []  Uses only gestures (eye, blinking eye or head movement/pointing)  []  Unable to express thoughts/feelings/needs (speech unintelligible or inappropriate)  [x]  Unresponsive  Notes:      Mental Status:  []  Alert-oriented to:     []  Person     []  Place     []  Time  [x]  Comatose-responds to:    []   Verbal stimuli    []  Tactile stimuli    [x]  Painful stimuli  []  Forgetful  []  Disoriented/Confused  []  Lethargic  []  Agitated  []  Other (specify):    Notes:      Patients description of Illness/Current Health Status:    [x]  Patient unable to discuss  []  Patient unwilling to discuss  []  (Specify)        Knowledge/Understanding of Disease Process  Patient:    []  Demonstrates knowledge/understanding of disease process   []  Demonstrates knowledge/understanding of treatment plan   []  Demonstrates knowledge/understanding of prognosis   []  Demonstrates acceptance of prognosis   []  Demonstrates knowledge/understanding of resuscitation status   []  Other (specify)  Caregiver:   [x]  Demonstrates knowledge/understanding of disease process   [x]  Demonstrates knowledge/understanding of treatment plan   [x]  Demonstrates knowledge/understanding of prognosis   [x]  Demonstrates acceptance of prognosis   [x]  Demonstrates knowledge/understanding of resuscitation status   []  Other (specify)  Notes:      Patients living arrangement/care setting:  Use the PRIOR COLUMN when the PATIENTS current health status necessitated a change in his/her primary residence.     Prior Current Response              []             []    Patients own home/residence              [x]             []    Home of family member/friend              []             []    Boarding home              []             []    Assisted living facility/care home center              []             []    Hospital/Acute care facility              []             []    Skilled nursing facility              []             []    Long term care facility/Nursing home              []             [x]    Hospice in Patient - Comfort bed     Primary Caregiver:  []  No Primary Caregiver  Name of Primary Caregiver: Arvella Dandy (POA)  Relationship or Primary Caregiver:    []  Spouse/Significant other       [x]  Natural Child        []  Step child       []  Sibling   []  Parent   []  Friend/Neighbor   []  Community/Congregational Volunteer   []  Paid help   []  Other (specify):___________  Notes:  Patient lived with her other daughter, Eduar Narvaez, but she is not here today.      Family members/Significant others:  Name: Arvella Dandy Relationship: Daughter  Phone Number: [phone] H / [phone] C  Actively involved in care? [x]  Yes  []  No    Name: Lupis Blevins  Relationship: Daughter  Phone Number: [phone]  Actively involved in care? [x]  Yes  []  No    Name: Shelly Moore  Relationship:  Son  Phone Number: [phone]  Actively involved in care?   [x]  Yes  []  No    Social support systems: (select ONE best description)  [x]  Excellent social support system which includes three or more family members or friends  []  Good social support system which includes two or less members or friends  []  [address] support which includes one family member or friend  []  Poor social support; no family members or friends; basically ALONE  Notes:      Emotional Status: (angelia all that apply)    Patient Caregiver Response                 []                [x]    Mood/Affect stable and appropriate                   []                []    Angry                 []                []    Anxious                 []                []    Apprehensive                 []                []    Avoidant                 []                []    Clinging                 []                []    Depressed                 []                []    Distraught                 []                []    Elated                 []                []    Euphoric                 []                []    Fearful                 []                []    Flat Affect                 []                []    Helpless                 []                []    Hostile                 []                []    Impulsive                 []                []    Irritable                 []                []    Labile                 []                []    Manic                 []                []    Restlessness                 []                []    Sad                 []                []    Suspicious                 []                []    Tearful                 []                []    Withdrawn Notes: Family present today.     Coping Skills (strengths/weakness):    Patient: Coping Skills (strength/weakness):    Family/caregiver (strength/weakness): Luana, feel patient has lived a \"full\" life     [place] of care (angelia all that apply):     [x]  No burden evident   []  Family must administer medications   []  Illness causing financial strain   []  Family/Support feels overwhelmed   []  Family/Support sleep disturbed with patients care   []  Patients care causes extra physical stress  of death  []  Illness causes changes in family lifestyle  []  Illness impacting family/support employment  []  Family experiencing increased time demands  []  Patients behavior endangers family  []  Denial of patients illness  []  Concern over outcome of illness/fear  []  Patients behavior embarrassing to family   Notes:      Risk Factors: (angelia all that apply):    [x]  No burden evident   []  Alcohol abuse  []  Financial resources inadequate to meet basic needs (food/house/etc)  []  Financial resources inadequate to meet health care needs (supplies/equipment/medications)  []  Food/nutrition resources inadequate  []  Home environment unsafe/inadequate for home care  []  Homicidal risk  []  Lives alone or without concerned relatives  []  Multiple medications/complex schedule  []  Physical limitations increase likelihood of falls  []  Plan of care/treatments complicated  []  Substance use/abuse  []  Suicidal risk  []  Visual impairment threatens safety/ability to perform self-care  []  Other (specify):     Abuse/Neglect (actual/potential risks):  [x]  No signs of abuse/neglect  []  History of abuse/neglect                 []  [id number]          []  Sexual  []  History of domestic violence  []  Lacks adequate physical care  []  Lacks emotional nurturing/support  []  Lacks appropriate stimulation/cognitive experiences  []  Left alone inappropriately  []  Lacks necessary supervision  []  Inadequate or delayed medical care  [] Unsafe environment (i.e guns/drug use/history of violence in the home/etc.)  []  Bruising or other physical signs of injury present  []  Other (specify):  Notes:   []  Refer to child/adult protective services      Current Sources of Stress (in Addition to Current Illness):   [x]  None reported  []  Bills/Debt    []  Career/Job change    []   (short term)    []   (long term)    []  Death of a child (recent)    []  Death of a parent (recent)   []  Death of a spouse (recent)   []  Employment status changed   []  Family discord    []  Financial loss/Inadequate inther (specify):come  []  Job loss  []  Legal issues unresolved  []  Lifestyle change  []  Marital discord  []  Marriage within the last year  []  Paperwork (insurance/legal/etc) overwhelming  []  Separation/Divorce  []  Other (specify):  Notes:      Current [employer] Being Utilized     1. None at this time. Interventions/Plan of Care     1. Assess social and emotional factors related to coping with end of life issues  2. Community resource planning/referral   3. Relocation to different care setting if/when symptoms stabilize  4. Discharge Planning     1. None at this time. Patient likely to die inpatient, is close to the end and unresponsive. Hospice Bereavement Assessment     3/6/2017    Bereaved Name: Sriram Maravilla  Address: [address] [address], [place] , [address]   Phone: [phone] H/ [phone] C  Bereaved Date of Birth  Bereaved Gender:  [x]  Female  []  Male  Date Assessment Completed: 03/06/17    Relationship to the Patient:  []  Spouse/Significant other    []  Parent  [x]  Natural child      []  Step child   []  friend/Neighbor  []  Sibling  []  Other (specify):     Primary Caregiver  []  Yes    [x]  No : Another daughter, Elvia Orr was primary CG, pt lived with her.  She is not present today.     Social Support Systems  [x]  Excellent social support system which includes three or more willing family members or friends  []  Good social support system which includes two or less willing family members or friends  []  [address] support which includes one willing family member or friend  []  Poor social support; no willing family members or friends; basically ALONE     Frequency of Contact/Communication with Family and Friends  [x]  Daily  []  Three or more times a week  []  One to two times a week  []  Two to three times per month  []  At least monthly  []  Less often than monthly     Community Support Groups/Counseling Services Currently Used:   [x]  None  []  Bereavement support group   Specify support group:   []  Behavioral Support group   Specify support group:   []  Cancer support group    Specify support group:   []  Mental health support/counseling  Specify support group:   []  Other (specify)     Sources of Stress/Grief in Addition to Patients Current illness or Death:    []  None reported  []  Bills/Debt    []  Career/Job change     []   (short term)  []   (long term)     []  Death of child      []  Death of parent    []  Death of spouse   []  Employment status changed     []  Family discord     []  Financial      []  Financial loss/Inadequate income  []  Job loss  []  Lifestyle change  []  Marital discord  []  Marriage within the last year  []  Separation/Divorce. []  Legal issues unresolved  []  Paperwork (insurance/legal/etc.) overwhelming  []  Personal illness/injury  [x]  Other (specify): Death of a sister 4 years ago. Unsure how she .     Stress Level Reported   [] 1      [x] 2      [] 3      [] 4      [] 5     [] 6      [] 7      [] 8      [] 9      [] 10  []  No Stress         []  Maximum Stress     Support Notes: Great family support, strong chau.     Emotional Behavior  Emotional Status:    [x]  NO SIGNIFICANT FINDINGS  []  Agitation/Restless      []  Angry       []  Anxious     []  Avoidant       []  Bereavement /loss issues     []  Clinging     []  Depressed       [] Distraught  []  Euphoric   []  Fearful   []  Flat affect  []  Helpless  []  Hostile   []  Irritable  []  Labile  []  Potential suicide risk  []  Sad  []  Strained family/social relationships  []  Suspicious  []  Tearful  []  Withdrawn         Coping of Caregiver: Check coping mechanisms observed during assessment  []  Confrontive coping (describes aggressive efforts to alter the situation and suggests some degree of hostility and risk taking)  []  Distancing (describes cognitive efforts to detach oneself and to minimize the significance of the situation)  []  Self-Controlling 9describes efforts to regulate ones own feelings)  []  Social Support (describes efforts to seek informational support, tangible support and emotional support)  [x]  Accepting (acknowledges ones own role an doing inner work that promotes personal peace)  []  Resignation  surrender (to accept no longer fight, to make peace w/circumstances)  []  Escape - Avoidance- Denial (describes wishful thinking and behavioral efforts to escape or avoid)  []  Planful Problem Solving (describes deliberates [id number] efforts to alter the situation)  []  Positive Reappraisal (describes efforts to create positive meaning by focusing on personal growth. It also has a Oriental orthodox dimension)  []  Other:     Significant behavior changed noted:   [x]  None  []  Alcohol use/abuse  []  Arrest or problems with law enforcement  []  Emotional lability  []  Increased incidence of physical illness/symptoms  []  Loss of interest in activities  []  School performance affected   []  Sleeping patters/habits altered  []  Substance/Drug use and/or abuse  []  Smoking (underage or excessive)  []  Truancy  []  Other:      Emotional/Behavior Notes: None to note    []  Suicidal Ideation Expressed/Discussed : None  []  Homicidal Ideation Expressed/Discussed :None           Coping skills (strengths/weaknesses): Luana, knowledge that patient lived well and lived a full life.    Support Services Needed/Referrals Required: Aida Dennison all that apply)     Suggested Resources  []         []  Financial management/counseling  []  Final arrangements/ planning  []  Food/Nutrition resources  []  Home maintenance/repairs/ services  []  Homemaker services  []  Income/Medical coverage assistance  []  Legal Assistance   []  Mental health referral   []  Protective services  []  Relocation to different care setting  []  Transportation   []  Other:             Bereavement Follow-Up Plan: Regular mailings       Financial  [x]  Independent: Manages financial affairs without assistance  []  Minimal Assistance: Needs prompting/reminders to pay bills/make deposits/cash checks or manage accounts  []  Moderate Assistance: Needs supervision of all financial tasks  []  Total Assistance: Unable to manage her/his own financial affairs  []  Unknown  Notes     Survivor Risk Assessment Summary (Actual or Potential Risks to the Bereavement Process):     []  Abuse or history of abuse observed or reported within family system  []  Concurrent stressful events or situations observed or reported  []  Dependent children reside within household  []  Family discord exhibited/described  []  Feelings of guilt expressed by family members  []  Financial status significantly affected by illness/death  []  Marital discord exhibited/described  []  Neglect observed or reported within the family system  []  Patient is a single-parent with dependent children  []  Psychiatric illness (or history) reported  []  Social Support systems limited  []  Spiritual distress observed or reported  []  Survivor frail/elderly/dependent  []  Survivor showing emotional and/or physical signs of stress/distress  []  Other (specify):  Notes:     Cultural Perspective Regarding Death:    []  Yes    [x]  No  Cultural Notes     Bereavement Assessment:     [x]  LOW RISK Indications:    [x]  normal grief   [x]  good support    [x]  open expression      []  MODERATE RISK Indications:    []  grief normal but severe    []  depression (taking meds. professional help)   []  somatic distress   []  low support    []  Hx of difficulty w/ loss    []  unresolved conflict w/ the .  [facility]  []  HIGH RISK Indications:    []  Hx mental illness    []  Suicidal ideation    []  Depression (no meds.  or prof. help)   []  Somatic distress   []  Excessive guilt or anger    []  Multiple losses    []  unresolved losses , other life crises.        MSW Assessment Completed by: Vinayak Hernández LCSW  17    Time In :2:50 pm       Time Out: 3:10 pm

## 2017-03-06 NOTE — CERTIFICATE OF TERMINAL ILLNESS
Hospice Physician Admission Narrative   (Certification of Terminal Illness)    [facility]  Good Help to Those in Need  [phone]    Arabella Soto    Date of Hospice Admission: 3-6-17  Hospice Attending: Dr Broderick Broderick Diagnosis: Sepsis  Diagnoses RELATED to the terminal prognosis: UTI, acute kidney failure, hyperkalemia, metabolic acidosis  Other Diagnoses: dementia, hypoalbuminemia, debility/bedbound, pressure ulcers     HOSPICE NARRATIVE COMPOSED BY PHYSICIAN   Rationale for a prognosis of life expectancy of 6 months or less if the disease follows its normal course:    Arabella Soto is a 80y.o. year old who was admitted to [facility]. The patient's principle diagnosis of sepsis has resulted in unresponsiveness and hospitalization since 2-5-16. Pt was admitted to [facility] with AMS and rapidly became unresponsive and hypotensive requiring ivf boluses, , iv abx and pressors. Pt is now comfort care after discussions with  Family and has been weaned off of pressors. Functionally, the patient's Palliative Performance Scale has declined over a period of days and is estimated at 10. Objective information that support this patients limited prognosis includes: pt is nonresponsive. BP 75/40. Wbc 11.2 hb 8.9  Bun/cr 104. 3.3  Potassium 5.5. albumin 1.4  Urine cx--Gram negative rods. .  The patient/family chose comfort measures with the support of Hospice. Attestation: I confirm that I composed this narrative as the physician and is based on my review of the patient's medical record and/or examination of the patient. ______________________________________________________________________    SmartPhrase LCD for Hospice Diagnosis (.hospicecriteria. ..)  Hospice General Guidelines for Non-Disease Specific Diagnoses     1. Decline in clinical status guidelines   *Not considered to be reversible  *Decline from baseline over time    1.  Progression of disease as documented by worsening clinical status, symptoms, signs and laboratory results    A. Clinical Status  [x] Recurrent or intractable infections such as pneumonia, sepsis or upper urinary tract. [] Progressive inanition (exhaustion caused by lack of nourishment)  [] Weight loss not due to reversible causes such as depression or use of diuretics  [] Decreasing anthropomorphic measurements (mid-arm circumference, abdominal girth), not due to reversible causes such as depression or use of diuretics  [x] Decreasing serum albumin or cholesterol  [] Dysphagia leading to recurrent aspiration and/or inadequate oral intake documented by decreasing food portion consumption. B. Symptoms  [] Dyspnea with increasing respiratory rate  [] Cough, intractable   [] Nausea/vomiting poorly responsive to treatment  [] Diarrhea, intractable  [] Pain requiring increasing doses of major analgesics more than briefly. C. Signs  [x] Decline in systolic blood pressure to below 90 or progressive postural hypotension  [] Ascites  [] Venous, arterial or lymphatic obstruction due to local progression or metastatic disease  [] Edema  [] Pleural / pericardial effusion  [x] Weakness  [x] Change in level of consciousness    D. Laboratory (When available. Lab testing is not required to establish hospice eligibility.)  [] Increasing pCO2 or decreasing pO2 or decreasing SaO2  [x] Increasing calcium, creatinine or liver function studies  [] Increasing tumor markers (e.g. CEA, PSA)  [] Progressively decreasing or increasing serum sodium or increasing serum potassium    2. Decline in Functional Status  [x] Karnofsky Performance Status (KPS) or Palliative Performance Score (PPS) from <70% due to progression of disease. 3. Health Care Utilization  [] Increasing emergency room visits, hospitalizations, or physicians visits related to hospice primary diagnosis    4. Dementia  [x] Progressive decline in Functional Assessment Staging (FAST) for dementia (from ? 7A on the FAST)    5. ADLs  [] Progression to dependence on assistance with additional activities of daily living (See Part II, Section 2)    6. Pressure Ulcers  [x] Progressive stage 3-4 pressure ulcers in spite of optimal care      Part II. Non-disease specific baseline guidelines   (both of these should be met)    [x] Physiologic impairment of functional status as demonstrated by: Karnofsky Performance Status (KPS) or Palliative Performance Score (PPS) <70%.      Dependence on assistance for two or more activities of daily living (ADLs)  [x] Feeding  [x] Ambulation  [x] Continence  [x] Transfer  [x] Bathing  [x] Dressing

## 2017-03-06 NOTE — PROGRESS NOTES
Hospitalist Progress Note    NAME: Arlene Em   :  1926   MRN:  [id number]       Interim Hospital Summary: 80 y.o. female whom presented on 3/5/2017 with      Assessment / Plan:  Septic shock with  jimbo on ckd, and metabolic acidosis, hyperkalemia,   With UTI  Encephalopathy with underlying dementia    Aggressive volume resuscitation  One more litre bolus now,  Cont bicarb and dopamine  vanco and ceftriaxone, follow the cultures  Not a dialysis candidate  Seen by nephrology    ua positive,   Blood and urine cultures on 3/5 pending  Chest xray neg    Dementia  Pressure ulcers      Hyperlipidemia  Holding statin given the elevated lft  hba1c 5.9    Had the family meeting, poor prognosis, would not survive this admission  Over all all would be appropriate for hospice  Discussed dementia, worsening, pressure ulcers, declining functional status  Not eating and drinking, natural way of progression    Family would like to talk to hospice  Placed the consult , talk to CM  For now cont antibiotics and pressors    Code status:dnr  Prophylaxis: heparin  Recommended Disposition:tbd     Subjective:     Chief Complaint / Reason for Physician Visit  Hypotensive, requiring pressors  . Discussed with RN events overnight. Review of Systems:  Symptom Y/N Comments  Symptom Y/N Comments   Fever/Chills    Chest Pain     Poor Appetite    Edema     Cough    Abdominal Pain     Sputum    Joint Pain     SOB/LOPEZ    Pruritis/Rash     Nausea/vomit    Tolerating PT/OT     Diarrhea    Tolerating Diet     Constipation    Other       Could NOT obtain due to: dementia     Objective:     VITALS:   Last 24hrs VS reviewed since prior progress note.  Most recent are:  Patient Vitals for the past 24 hrs:   Temp Pulse Resp BP SpO2   17 0721 99.1 °F (37.3 °C) (!) 112 22 95/40 93 %   17 0600 - (!) 113 - (!) 87/45 -   17 0500 99.7 °F (37.6 °C) (!) 108 - (!) 81/36 94 %   17 0430 - (!) 109 - 94/56 -   17 0400 100.2 °F (37.9 °C) (!) 116 20 92/44 96 %   03/06/17 0330 - (!) 115 - (!) 93/38 -   03/06/17 0300 - (!) 113 - 111/45 -   03/06/17 0230 - (!) 109 - 112/43 -   03/06/17 0200 - (!) 106 - 116/50 -   03/06/17 0130 - (!) 102 - 107/48 -   03/06/17 0100 - 94 - 117/53 -   03/06/17 0030 - 85 - 111/82 -   03/06/17 0000 95 °F (35 °C) 83 - (!) 81/60 -   03/05/17 2330 - 70 - 116/42 -   03/05/17 2211 - 66 - (!) 109/36 -   03/05/17 1930 - (!) 59 22 (!) 85/35 -   03/05/17 1920 - 60 21 - 93 %   03/05/17 1915 95.1 °F (35.1 °C) 63 23 (!) 91/36 91 %   03/05/17 1853 - 61 21 - 93 %   03/05/17 1845 - (!) 54 18 102/41 93 %   03/05/17 1815 - (!) 54 18 123/65 94 %   03/05/17 1700 - (!) 55 22 (!) 87/40 91 %   03/05/17 1646 - (!) 54 21 (!) 81/33 94 %   03/05/17 1630 - (!) 54 21 (!) 74/30 -   03/05/17 1615 - (!) 56 19 (!) 65/23 -   03/05/17 1600 - 61 24 (!) 58/27 -   03/05/17 1554 - 65 19 (!) 53/33 -   03/05/17 1512 97.2 °F (36.2 °C) 67 22 (!) 89/56 -       Intake/Output Summary (Last 24 hours) at 03/06/17 0817  Last data filed at 03/06/17 0510   Gross per 24 hour   Intake          1042.08 ml   Output              650 ml   Net           392.08 ml        PHYSICAL EXAM:  General: WD, WN. Drowsy,   EENT:  EOMI. Anicteric sclerae. MMM  Resp:  CTA bilaterally, no wheezing or rales.   No accessory muscle use  CV:  Regular  rhythm,  No edema  GI:  Soft, Non distended, Non tender.  +Bowel sounds  Neurologic:  Awake, not following commands,   Psych:   . Not anxious nor agitated  Skin:  Pressure ulcers    Reviewed most current lab test results and cultures  YES  Reviewed most current radiology test results   YES  Review and summation of old records today    NO  Reviewed patient's current orders and MAR    YES  PMH/SH reviewed - no change compared to H&P  ________________________________________________________________________  Care Plan discussed with:    Comments   Patient     Family  y    RN y    Care Manager     Consultant Multidiciplinary team rounds were held today with , nursing, pharmacist and clinical coordinator. Patient's plan of care was discussed; medications were reviewed and discharge planning was addressed. ________________________________________________________________________  Total NON critical care TIME:  35  Minutes    Total CRITICAL CARE TIME Spent:   Minutes non procedure based      Comments   >50% of visit spent in counseling and coordination of care y    ________________________________________________________________________  Adela Corbin MD     Procedures: see electronic medical records for all procedures/Xrays and details which were not copied into this note but were reviewed prior to creation of Plan. LABS:  I reviewed today's most current labs and imaging studies.   Pertinent labs include:  Recent Labs      03/06/17 0438 03/05/17   1556   WBC  11.2*  15.2*   HGB  8.9*  8.9*   HCT  26.4*  26.9*   PLT  318  350     Recent Labs      03/06/17 0438  03/05/17   2244  03/05/17   1556   NA  140  138  132*   K  5.5*  5.8*  6.5*   CL  107  107  104   CO2  16*  18*  14*   GLU  58*  90  109*   BUN  104*  112*  121*   CREA  3.37*  3.71*  4.25*   CA  7.4*  7.8*  7.9*   MG  2.6*   --   2.9*   PHOS  5.8*   --    --    ALB  1.4*   --   1.6*   TBILI  0.4   --   0.3   SGOT  47*   --   52*   ALT  42   --   46       Signed: Adela Corbin MD

## 2017-03-06 NOTE — HOSPICE
[facility] RN note:  Consult noted. Reviewing chart. Will contact family shortly. 12:30----hospice RN Dariel Lnider and this RN in to meet with pt, daughter Carmen Heard and son Isiah Chilel. Pt with declining BP (87/45) despite dopamine and fluid boluses. Family state that she is in pain with any movement but at rest appears comfortable, unresponsive, has declined dramatically over the last 2 weeks but became unresponsive at home leading to current EMS trip to ED. Discussed hospice philosophy, services, plan of care as it relates to inpt hospice. Family in agreement with providing a peaceful passing inpt as she is too unstable to transport to another setting at this time  Approval for inpt admission at routine level of care per DR Blaze Horton. Consent forms being signed with hospice RN [facility]. Pt to be transferred to Oncology  [phone]. Patient Name Sergey Amato   Date of Admission 3/5/2017    1926   Discharge Order Received From Dr Pily Desai Diagnosis Discussed with Patient and Approved by Hospice Attending Septic shock [facility])   Attending Provider Elected by Patient Eulogio Luna MD   Attending Providers Agrees to Service as Attending and Admission Orders Received Dr Jaz Serrano of Care routine   GIP Symptoms if applicable    DNR/DDNR yes           Primary Home Based Nurse Report Received if being Admitted from Home    Primary Caregiver or JOSEPH Laura dtr       Thank you for the opportunity to care for this pt and family. Please contact hospice at [phone] with any questions or concerns.

## 2017-03-06 NOTE — PROGRESS NOTES
Pt admitted to PCU room #2249 from ER via stretcher with RN and PCT. Pt's son and daughter at bedside. Assessment completed as charted. Dual skin assessment completed by myself, and Papi Randall RN. Pt has multiple pressure ulcers, wound consult ordered. Left and right hip DTIs and sacral DTIs- foam dressing applied after cleansing. Left medial heel, blistered DTI noted, left open to air, Prevalon boot applied. Right medial foot and ankle with 2 blisters, prevalon boot also applied. Pt is lethargic, does not follow commands but withdraws from pain or any hands on care from nursing staff. Pt is hypotensive and hypothermic, rectal temp 94.9  Jaime hugger blanket applied. Dopamine drip is infusing at 5mcg/kg/min continuously. Bicarb drip infusing, pt is completing the second fluid bolus that was started in ER. Dr. Tawnya Kelley at bedside to assess pt. New orders have been received. 0000 Jaime hugger in place, continues to be hypotensive. #16 Fr 10 cc Amaya catheter inserted using sterile technique without difficulty (for accurate intake and output). Ordered by Nephrology MD.  Bgt= 91. No acute changes. 0100   notified of recent lab results. No new orders at this time. 0520 bgt= 64, rechecked= 68. Dextrose 12.5grams given at this time  9334  BGT= 126.  0630 Hospitalist paged regarding hypoglycemia and hypotension. New order for NS 250ml bolus. 2538  Bedside shift change report given to [facility] AND [facility]. SBAR, MAR, VS reviewed.

## 2017-03-06 NOTE — PROGRESS NOTES
Pressure Ulcer Prevention In basket Alert Received for Kana < 14 (moderate risk).      Suggested Care Plan/Interventions for Nursing    An Envision air mattress on a Versacare bed frame has been ordered for pressure redistribution. Complete Kana Pressure Ulcer Risk Scale and use sub scores to identify appropriate interventions. 1. Perform Assessment: skin, changes in LOC, visual cues for pain, monitor skin under medical devices  2. Respond to Reduced Sensory Perception: changes in LOC, check visual cues for pain, float heels, suspension boots, pressure redistribution bed/mattress/chair cushion, turning and reposition approximately every 2 hours (pillows & wedges), pad between skin to skin, turn & reposition  3. Manage Moisture: absorbent under pads, internal / external urinary device, internal /  external fecal device, minimize layers, contain wound drainage, access need for specialty bed, limit adult briefs, maintain skin hydration (lotion/cream), moisture barrier, offer toileting every hour  4. Promote Activity: increase time out of bed, chair cushion, PT/OT evaluation, trapeze to reposition, pressure redistribution bed/mattress/chair  5. Address Reduced Mobility: float heels / suspension boot, HOB 30 degrees or less, pressure redistribution bed/mattress/cushion, PT / OT evaluation, turn and reposition approximately every 2 hours (pillows & wedges)  6. Promote Nutrition: document food / fluid / supplement intake, encourage/assist with meals as needed  7. Reduce Friction and Shear: transferring/repositioning devices (lift/draw sheet), lift team/ patient mobility team, feet elevated on foot rest, minimize layers, foam dressing / transparent film / skin sealants, protective barrier creams and emollients, transfer aides (board, Keyon lift, ceiling lift, stand assist), HOB 30 degrees or less, trapeze to reposition.   Wound Care Team

## 2017-03-06 NOTE — CONSULTS
NEPHROLOGY CONSULT NOTE     Patient: Willie Olivarez MRN: [id number]  PCP: Gomez Redman MD   :     1926  Age:   [address] Sex:  female      Referring physician: Dimitrios Velazquez MD  Reason for consultation: [address] female with Septic shock ([facility] [address] complicated by MARTIN   Admission Date: 3/5/2017  3:00 PM  LOS: 0 days     ASSESSMENT and PLAN :   MARTIN on CKD   · 2/2 Sepsis   · Appears to be severely volume contracted   · Renal US , urine chemistries ordered  · Agree with IVF in the form of Bicarb gtt -- at 100 cc/ hr   · She has received 2 L fluid bolus in ER   · Hold losartan and allopurinol   · Not a candidate for Dialysis due to her dementia     CKD Stage III :  - baseline Cr 1.4-1.6 mg/dl In dec 2016. Follows with Dr Yonny Long at [facility]  - etiology : likely HTN and nephrosclerosis. - she had  B/L atrophied kidneys on renal US in 2988     Metabolic acidosis :  - 2/2 ARF . Lactate Normal   - Bicarb gtt as above     Septic Shock   - presumed urinary source based on UA  - abx per primary team     Hyperkalemia :  - No EKG changes  - for medical management     Hypovolemic Hyponatremia     Anemia    Dementia     ___________________________________  Thank you for asking us to see Ms Mercedes Guevara   Discussed plans w/ family          Subjective:   HPI: Willie Olivarez is a [address].  female who has been admitted to the hospital for AMS. She was brought in via ambulance. She was altered, Hypothermic and severely hypotensive when EMS arrived at her house. Pt is demented and unable to give any hx.  Hx is from ED physician and available medical records   In ER she was found to be in septic shock with marked ARF for which we were asked to see her   Her Cr was > 4, hyperkalemic and acidotic   Her baseline Cr was 1.2 mg/dl in Dec when she was admitted for bradycardia   Her renal US in  showed small shrunken kidneys   She is known to have Stage III CKD and followed by my partner Dr Yonny Long and her baseline Cr is 1.4 mg/dl in Nov She was last seen in Nov and since her last hospitalization she has not been back in office     Past Medical Hx:   1. CKD from her HTN/PVD with bilaterally small kidneys; GFR~39 cc/min at creat=1.4.  2. HTN   3. Type 2 diabetes- stable  4. Gout  5. Hyperlipidemia  6. Glaucoma  7. Primary hyperparathyroidism, S/P PTX~2000      Past Surgical Hx:  parathyroidiectomy     Medications:  Prior to Admission medications    Medication Sig Start Date End Date Taking? Authorizing Provider   allopurinol (ZYLOPRIM) 100 mg tablet Take 100 mg by mouth daily. Historical Provider   folic acid (FOLVITE) 1 mg tablet Take 1 mg by mouth daily. Historical Provider   gabapentin (NEURONTIN) 300 mg capsule Take 300 mg by mouth two (2) times a day. Historical Provider   losartan (COZAAR) 50 mg tablet Take 50 mg by mouth daily. Historical Provider   simvastatin (ZOCOR) 20 mg tablet Take 20 mg by mouth nightly. Historical Provider       No Known Allergies    Social Hx:  reports that she has quit smoking. She does not have any smokeless tobacco history on file. She reports that she does not drink alcohol or use illicit drugs. Family History   Problem Relation Age of Onset    Heart Disease Mother     Cancer Father        Review of Systems:  Unable to obtain any ROS due to her altered mental status and dementia .      Objective:    Vitals:    Vitals:    03/05/17 1845 03/05/17 1853 03/05/17 1915 03/05/17 1920   BP: 102/41  (!) 91/36    Pulse: (!) 54 61 63 60   Resp: 18 21 23 21   Temp:       SpO2: 93% 93% 91% 93%   Weight:         I&O's:     Visit Vitals    BP (!) 91/36    Pulse 60    Temp 97.2 °F (36.2 °C)    Resp 21    Wt 53.4 kg (117 lb 11.6 oz)    SpO2 93%    BMI 21.53 kg/m2       Physical Exam:  General: not following commands   HEENT: pale, anicteric   Neck:Supple,no mass palpable  Lungs : Clears to auscultation Bilaterally, Normal respiratory effort  CVS: RRR, S1 S2 normal, No rub, 1+LE edema  Abdomen: Soft, Non tender, No hepatosplenomegaly, bowel sounds present  Extremities: contractures  Skin: No rash or lesions. Lymph nodes: No palpable nodes  MS: deformities   Neurologic: unable to assess. Psych:  Not agitated     Laboratory Results:  Recent Labs      03/05/17   1556   NA  132*   K  6.5*   CL  104   CO2  14*   GLU  109*   BUN  121*   CREA  4.25*   CA  7.9*   MG  2.9*   ALB  1.6*   SGOT  52*   ALT  46     Recent Labs      03/05/17   1556   WBC  15.2*   HGB  8.9*   HCT  26.9*   PLT  350     No results found for: SDES  No results found for: CULT  Recent Results (from the past 24 hour(s))   CBC WITH AUTOMATED DIFF    Collection Time: 03/05/17  3:56 PM   Result Value Ref Range    WBC 15.2 (H) 3.6 - 11.0 K/uL    RBC 3.33 (L) 3.80 - 5.20 M/uL    HGB 8.9 (L) 11.5 - 16.0 g/dL    HCT 26.9 (L) 35.0 - 47.0 %    MCV 80.8 80.0 - 99.0 FL    MCH 26.7 26.0 - 34.0 PG    MCHC 33.1 30.0 - 36.5 g/dL    RDW 17.1 (H) 11.5 - 14.5 %    PLATELET 242 324 - 386 K/uL    NEUTROPHILS 84 (H) 32 - 75 %    LYMPHOCYTES 10 (L) 12 - 49 %    MONOCYTES 5 5 - 13 %    EOSINOPHILS 1 0 - 7 %    BASOPHILS 0 0 - 1 %    ABS. NEUTROPHILS 12.7 (H) 1.8 - 8.0 K/UL    ABS. LYMPHOCYTES 1.5 0.8 - 3.5 K/UL    ABS. MONOCYTES 0.8 0.0 - 1.0 K/UL    ABS. EOSINOPHILS 0.2 0.0 - 0.4 K/UL    ABS.  BASOPHILS 0.0 0.0 - 0.1 K/UL    DF SMEAR SCANNED      RBC COMMENTS NADIYA CELLS  1+        RBC COMMENTS ANISOCYTOSIS  1+       CK W/ CKMB & INDEX    Collection Time: 03/05/17  3:56 PM   Result Value Ref Range    CK 1071 (H) 26 - 192 U/L    CK - MB 14.6 (H) <3.6 NG/ML    CK-MB Index 1.4 0 - 2.5     METABOLIC PANEL, COMPREHENSIVE    Collection Time: 03/05/17  3:56 PM   Result Value Ref Range    Sodium 132 (L) 136 - 145 mmol/L    Potassium 6.5 (H) 3.5 - 5.1 mmol/L    Chloride 104 97 - 108 mmol/L    CO2 14 (LL) 21 - 32 mmol/L    Anion gap 14 5 - 15 mmol/L    Glucose 109 (H) 65 - 100 mg/dL     (H) 6 - 20 MG/DL    Creatinine 4.25 (H) 0.55 - 1.02 MG/DL    BUN/Creatinine ratio 28 (H) 12 - 20      GFR est AA 12 (L) >60 ml/min/1.73m2    GFR est non-AA 10 (L) >60 ml/min/1.73m2    Calcium 7.9 (L) 8.5 - 10.1 MG/DL    Bilirubin, total 0.3 0.2 - 1.0 MG/DL    ALT (SGPT) 46 12 - 78 U/L    AST (SGOT) 52 (H) 15 - 37 U/L    Alk.  phosphatase 89 45 - 117 U/L    Protein, total 6.2 (L) 6.4 - 8.2 g/dL    Albumin 1.6 (L) 3.5 - 5.0 g/dL    Globulin 4.6 (H) 2.0 - 4.0 g/dL    A-G Ratio 0.3 (L) 1.1 - 2.2     TROPONIN I    Collection Time: 03/05/17  3:56 PM   Result Value Ref Range    Troponin-I, Qt. 0.04 <0.05 ng/mL   MAGNESIUM    Collection Time: 03/05/17  3:56 PM   Result Value Ref Range    Magnesium 2.9 (H) 1.6 - 2.4 mg/dL   LACTIC ACID, PLASMA    Collection Time: 03/05/17  3:56 PM   Result Value Ref Range    Lactic acid 1.7 0.4 - 2.0 MMOL/L   URINALYSIS W/ REFLEX CULTURE    Collection Time: 03/05/17  3:56 PM   Result Value Ref Range    Color DARK YELLOW      Appearance (A) CLEAR       Macroscopic performed on spun urine due to gross blood  or mucus    Specific gravity 1.010 1.003 - 1.030      pH (UA) 8.0 5.0 - 8.0      Protein 100 (A) NEG mg/dL    Glucose NEGATIVE  NEG mg/dL    Ketone NEGATIVE  NEG mg/dL    Bilirubin NEGATIVE  NEG      Blood TRACE (A) NEG      Urobilinogen 0.2 0.2 - 1.0 EU/dL    Nitrites NEGATIVE  NEG      Leukocyte Esterase LARGE (A) NEG      WBC >100 (H) 0 - 4 /hpf    RBC 5-10 0 - 5 /hpf    Epithelial cells FEW FEW /lpf    Bacteria 4+ (A) NEG /hpf    UA:UC IF INDICATED URINE CULTURE ORDERED (A) CNI         Urine dipstick:   Lab Results   Component Value Date/Time    Color DARK YELLOW 03/05/2017 03:56 PM    Appearance  03/05/2017 03:56 PM     Macroscopic performed on spun urine due to gross blood  or mucus    Specific gravity 1.010 03/05/2017 03:56 PM    Specific gravity 1.015 12/24/2016 04:00 PM    pH (UA) 8.0 03/05/2017 03:56 PM    Protein 100 03/05/2017 03:56 PM    Glucose NEGATIVE  03/05/2017 03:56 PM    Ketone NEGATIVE  03/05/2017 03:56 PM    Bilirubin NEGATIVE  03/05/2017 03:56 PM Urobilinogen 0.2 03/05/2017 03:56 PM    Nitrites NEGATIVE  03/05/2017 03:56 PM    Leukocyte Esterase LARGE 03/05/2017 03:56 PM    Epithelial cells FEW 03/05/2017 03:56 PM    Bacteria 4+ 03/05/2017 03:56 PM    WBC >100 03/05/2017 03:56 PM    RBC 5-10 03/05/2017 03:56 PM       I have reviewed the following: All pertinent labs, microbiology data, radiology imaging for my assessment     ECG- Rev: Yes  Xray/CT/US/MRI REV: Yes    Care Plan discussed with:  pts family     Chart reviewed. Total time spent with patient:  55 min   Medications list Personally Reviewed   [x]      Yes     []               No      Thank you for allowing us to participate in the care of this patient. We will follow patient. Please dont hesitate to call with any questions    Kobi Bolton MD  3/5/2017    [facility] [address] [place] [address], [facility]  Phone - [phone]   Fax - [phone]  [email or web address]

## 2017-03-06 NOTE — PROGRESS NOTES
PCU SHIFT NURSING NOTE      Bedside shift change report given to Cely Leigh (oncoming nurse) by Steve Abreu RN (offgoing nurse). Report included the following information SBAR, Kardex, MAR and Recent Results. 1211: Hospice Will be to see patient. Hospice Nurse Marilyn cell phone [phone]    Shift Summary: 0730: Pressures low on Dopamine 5mcg/kg/min. Multiple pressure ulcers most unstageable. Pressures improve when lying flat. Responsive to painful stimuli only. 0830: Page Dr. Brandon Jeffrey pressure 63/28. Order received for 1 L bolus NS. No working IV's.  0900: Peripheral lines secured. Begin bolus  1000: Pressures improved 109/55 highest BP with bolus. 1030: Pressures beginning to drop. 1100: BP 89/36   1218: BP 75/40 Notify hospice nurse. Admission Date 3/5/2017   Admission Diagnosis Septic shock ([facility] [address]   Consults IP CONSULT TO NEPHROLOGY        Consults   []PT   []OT   []Speech   [x]Case Management      [] Palliative      Cardiac Monitoring Order   [x]Yes   []No     IV drips   [x]Yes    Drip:  Dopamine                           Dose: 5mcg/kg/min  Drip:                            Dose:  Drip:                            Dose:   []No     GI Prophylaxis   []Yes   [x]No         DVT Prophylaxis   SCDs:             Felice stockings:         [] Medication   []Contraindicated   []None      Activity Level Activity Level: Bed Rest     Activity Assistance: Complete care   Purposeful Rounding every 1-2 hour? [x]Yes   Dee Score  Total Score: 4   Bed Alarm (If score 3 or >)   [x]Yes   [] Refused (See signed refusal form in chart)   Kana Score  Kana Score: 8   Kana Score (if score 14 or less)   [x]PMT consult   [x]Wound Care consult      [x]Specialty bed   [] Nutrition consult          Needs prior to discharge:   Home O2 required:    []Yes   [x]No    If yes, how much O2 required? Other:    Last Bowel Movement:        Influenza Vaccine Received Flu Vaccine for Current Season (usually Sept-March):  Unsure Patient/Guardian Refused (Notify MD): No   Pneumonia Vaccine           Diet Active Orders   Diet    DIET NPO      LDAs               Peripheral IV 03/05/17 Left Arm (Active)   Site Assessment Clean, dry, & intact 3/5/2017  4:25 PM       Peripheral IV 03/05/17 Right Wrist (Active)   Site Assessment Clean, dry, & intact 3/5/2017  4:26 PM                      Urinary Catheter Urinary Catheter 03/06/17 2- way; Amaya-Criteria for Appropriate Use: Medically/surgically unstable, Strict I/Os    Intake & Output   Date 03/05/17 0700 - 03/06/17 0659 03/06/17 0700 - 03/07/17 0659   Shift 3391-1599 9083-5119 24 Hour Total 6769-5906 8749-4944 24 Hour Total   I  N  T  A  K  E   I.V.  (mL/kg/hr)  1042.1  (1.6) 1042.1  (0.8)         DOPamine Volume (ml)  52.1 52.1         Volume (sodium bicarbonate (8.4%) 150 mEq in sterile water 1,000 mL infusion)  990 990         Volume (vancomycin (VANCOCIN) 500 mg in 0.9% sodium chloride 100 mL IVPB)  0 0         Volume (cefTRIAXone (ROCEPHIN) 2 g in 0.9% sodium chloride (MBP/ADV) 50 mL)  0 0       Other  0 0         Irrigation Volume Input (mL) (Urinary Catheter 03/06/17 2- way; Amaya)  0 0       Shift Total  (mL/kg)  1042.1  (19.5) 1042.1  (19.5)      O  U  T  P  U  T   Urine  (mL/kg/hr)  650  (1) 650  (0.5)         Urine Occurrence(s)  3 x 3 x         Urine Output (mL) (Urinary Catheter 03/06/17 2- way; Amaya)  650 650       Stool            Stool Occurrence(s)  1 x 1 x       Shift Total  (mL/kg)  650  (12.2) 650  (12.2)      NET  392.1 392.1      Weight (kg) 53.4 53.4 53.4 53.4 53.4 53.4         Readmission Risk Assessment Tool Score High Risk            24       Total Score        3 Relationship with PCP    4 More than 1 Admission in calendar year    5 Patient Insurance is Medicare, Medicaid or Self Pay    12 Charlson Comorbidity Score        Criteria that do not apply:    Patient Living Status    Patient Length of Stay > 5       Expected Length of Stay 4d 21h   Actual Length of Stay 1

## 2017-03-06 NOTE — HOSPICE
The  completed the spiritual assessment of the hospice patient with the patient's son and daughter. The patient was unresponsive during the visit. The patient is involved in both her son's and daughter's churches. They are both pastors. The patient receives good spiritual support form the Adventist and family. The patient began every morning with Bible reading and prayer. The  offered active listening as the family participated in life review. The patient's daughter said the patient is ready to die as she has been struggling with dementia for over 5 years. The daughter shared the loss of a sibling and how it affected the patient through out the last 4 years. The  and family discussed grief as it is manifested in people with dementia. There were no worries or anxieties shared. SPOC: Spiritual acuity - 1. The patient has a strong chau and good spiritual support from her fmaiyl and Adventist. No immediate spiritual issues. The  will offer spiritual support through active listening, end-of-life discussions and prayer.       Frequency: 1 wk 2; 5 PRN 14

## 2017-03-06 NOTE — WOUND CARE
Wound Care Consult: Chart reviewed and patient assessed for her wounds that were present on admission. Patient lives at home with her family who takes turns taking care of her. Patient has a Kana Score = 8, bedfast and does not voluntarily move. Pt. 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 She prefets contracted position but can straighten the legs with protest. The pt. Has Prevalon boots on her feet to off-load the heels. Pt. Being turned. At this time the patient is on a Dopamine drip and Bicarb drip infusing. She received a NS bolus IV this morning but her BP continues to stay low and drop into the 60's. Family would like to talk about initiating Hospice for her. Her daughter is at the bedside since last evening. She was present during the assessment of these wounds. Assessment and Teatment: The wounds were examined and treated today to help control the drainage and odor as the skin continues to break down the necrotic tissue. Both hips are maroon and black with minimal pink in both wound beds. The sacrum is the same with the most distal wound being yellow slough in the opening. This is shallow enough that the foam dressing is sufficient dressing for now. Cleaned all of these wounds with Carraklenz spray and then Betadine to control the odor and drainage. Foam dressings over each wound bed. See the details below for other wounds and other details. WOUND POA CONDITIONS    Wound Sacral/coccyx (Active) - Two openings   DRESSING STATUS Removed 3/6/2017 11:54 AM   DRESSING TYPE Foam 3/6/2017 11:54 AM   Pressure Ulcer Unstageable 3/6/2017 11:54 AM   Wound Length (cm) 12 cm 3/6/2017 11:54 AM   Wound Width (cm) 16 cm 3/6/2017 11:54 AM   Condition of Base Other (comment) 3/6/2017 11:54 AM   Condition of Edges Closed; Open 3/6/2017 11:54 AM   Tissue Type Maroon/purple 3/6/2017 11:54 AM   Tissue Type Percent Maroon/Purple 85 % 3/6/2017 11:54 AM   Tissue Type Percent Red 5 3/6/2017 11:54 AM   Tissue Type Percent Yellow 10 3/6/2017 11:54 AM   Drainage Amount  Small  3/6/2017 11:54 AM   Drainage Color Brown 3/6/2017 11:54 AM   Wound Odor None 3/6/2017 11:54 AM   Periwound Skin Condition Ecchymosis;Edematous; Excoriated; Indurated; Other (comment) 3/6/2017 11:54 AM   Cleansing and Cleansing Agents  Dermal wound cleanser;Betadine 3/6/2017 11:54 AM   Dressing Type Applied Foam 3/6/2017 11:54 AM   Procedure Tolerated Fair 3/6/2017 11:54 AM   Number of days:1       Wound Hip Right (Active)   DRESSING STATUS Removed 3/6/2017 11:54 AM   DRESSING TYPE Foam 3/6/2017 11:54 AM   Pressure Ulcer Unstageable 3/6/2017 11:54 AM   Condition of Edges Rolled/curled 3/6/2017 11:54 AM   Tissue Type Black;Maroon/purple;Red 3/6/2017 11:54 AM   Tissue Type Percent Maroon/Purple 90 % 3/6/2017 11:54 AM   Tissue Type Percent Red 10 3/6/2017 11:54 AM   Drainage Amount  None 3/6/2017 11:54 AM   Wound Odor None 3/6/2017 11:54 AM   Periwound Skin Condition Edematous; Erythema, non-blanchable; Increased warmth; Indurated 3/6/2017 11:54 AM   Cleansing and Cleansing Agents  Dermal wound cleanser;Betadine 3/6/2017 11:54 AM   Dressing Type Applied Foam 3/6/2017 11:54 AM   Procedure Tolerated Well 3/6/2017 11:54 AM          Wound Hip Left (Active)   DRESSING STATUS Removed 3/6/2017 11:54 AM   DRESSING TYPE Foam 3/6/2017 11:54 AM   Pressure Ulcer Unstageable 3/6/2017 11:54 AM   Wound Length (cm) 12 cm 3/6/2017 11:54 AM   Wound Width (cm) 8.5 cm 3/6/2017 11:54 AM   Condition of Base Other (comment);Pink;Eschar 3/6/2017 11:54 AM   Condition of Edges Rolled/curled 3/6/2017 11:54 AM   Tissue Type Maroon/purple;Black 3/6/2017 11:54 AM   Drainage Amount  Scant 3/6/2017 11:54 AM   Drainage Color Brown 3/6/2017 11:54 AM   Wound Odor None 3/6/2017 11:54 AM   Periwound Skin Condition Edematous; Ecchymosis; Erythema, non-blanchable; Indurated 3/6/2017 11:54 AM   Cleansing and Cleansing Agents  Dermal wound cleanser;Betadine 3/6/2017 11:54 AM   Dressing Type Applied Foam 3/6/2017 11:54 AM   Procedure Tolerated Fair 3/6/2017 11:54 AM          Wound Heel Left;Medial (Active)   DRESSING TYPE Open to air 3/6/2017 11:54 AM   Pressure Ulcer Unstageable 3/6/2017 11:54 AM   Wound Length (cm) 8 cm 3/6/2017 11:54 AM   Wound Width (cm) 4 cm 3/6/2017 11:54 AM   Condition of Edges Closed 3/6/2017 11:54 AM   Drainage Amount  None 3/6/2017 11:54 AM   Wound Odor None 3/6/2017 11:54 AM   Periwound Skin Condition Intact;Calloused 3/6/2017 11:54 AM   Cleansing and Cleansing Agents  Dermal wound cleanser;Betadine 3/6/2017 11:54 AM   Procedure Tolerated Well 3/6/2017 11:54 AM          Wound Ankle Medial;Right (Active)   DRESSING STATUS Removed 3/6/2017 11:54 AM   DRESSING TYPE Foam 3/6/2017 11:54 AM   Pressure Ulcer DTI 3/6/2017 11:54 AM   Condition of Base Other (comment) 3/6/2017 11:54 AM   Condition of Edges Open 3/6/2017 11:54 AM   Tissue Type Maroon/purple 3/6/2017 11:54 AM   Tissue Type Percent Maroon/Purple 100 % 3/6/2017 11:54 AM   Drainage Amount  Scant 3/6/2017 11:54 AM   Drainage Color Serosanguinous 3/6/2017 11:54 AM   Wound Odor None 3/6/2017 11:54 AM   Periwound Skin Condition Edematous; Erythema, non-blanchable 3/6/2017 11:54 AM   Cleansing and Cleansing Agents  Dermal wound cleanser;Betadine 3/6/2017 11:54 AM   Dressing Type Applied Foam 3/6/2017 11:54 AM   Procedure Tolerated Well 3/6/2017 11:54 AM           Plan: Betadine swabs to help control odor and drainage. Foam dressings every other day. Detailed orders placed for nursing. Specialty bed was ordered for this patient.    Betty Yang RN, BSN, [employer]

## 2017-03-06 NOTE — PROGRESS NOTES
Admission skin assessment performed by RNs Breonna ESPINOZA and Eliza Guy. Several wounds/ulcers noted, large blister to L heel, wounds with foam dressings to both hips, R ankle and sacrum. Patient seen and dressing change done by wound care nurse before admission to this unit.

## 2017-03-06 NOTE — PROGRESS NOTES
Initial Nutrition Assessment:    INTERVENTIONS/RECOMMENDATIONS:   Aggressive nutrition interventions not indicated if hospice desired     ASSESSMENT:   Patient medically noted for septic shock, acute renal failure, acidosis, and UTI. PMH for alzheimer's, DM, HTN, and CKD. Patient currently NPO. MD noting poor prognosis. Family interested in hospice. Hospice consult noted. Aggressive nutrition interventions not indicated at this time. RD available if plan of care changes. Diet Order: NPO  % Eaten:  No data found. Pertinent Medications: [x]Reviewed []Other:   Pertinent Labs: [x]Reviewed []Other: K+ 5.5, Phos 5.8, Mg 2.6, , Cr 3.37  Food Allergies: [x]None []Other   Last BM: no documentation   []Active     []Hyperactive  []Hypoactive       [] Absent BS  Skin:    [] Intact   [] Incision  [x] Breakdown (unstageable sacrum, bilateral hips, left heel. DTI right ankle) [] Other:    Anthropometrics:   Height: 5' 2\" (157.5 cm) Weight: 53.4 kg (117 lb 11.6 oz)   IBW (%IBW):   ( ) UBW (%UBW):   (  %)   Last Weight Metrics:  Weight Loss Metrics 3/5/2017 12/30/2016 12/23/2016 10/30/2015   Today's Wt 117 lb 11.6 oz 114 lb 114 lb 13.8 oz 130 lb   BMI 21.53 kg/m2 20.85 kg/m2 21.01 kg/m2 24.58 kg/m2       BMI: Body mass index is 21.53 kg/(m^2). This BMI is indicative of:   []Underweight    [x]Normal    []Overweight    [] Obesity   [] Extreme Obesity (BMI>40)     Estimated Nutrition Needs (Based on):   1084 Kcals/day (BMR (903) x 1. 2AF) , 80 g (1.5 g/kg bw) Protein  Carbohydrate:  At Least 130 g/day  Fluids: 1100 mL/day (1ml/kcal)    Pt expected to meet estimated nutrient needs: []Yes [x]No    NUTRITION DIAGNOSES:   Problem:  Increased protein needs      Etiology: related to wound healing     Signs/Symptoms: as evidenced by multiple pressure ulcers sacrum, bilateral hips, right ankle, left heel      NUTRITION INTERVENTIONS:  Meals/Snacks: General/healthful diet                  GOAL:   Agressive nutrition interventions not indicated - PO as desired     LEARNING NEEDS (Diet, Food/Nutrient-Drug Interaction):    [x] None Identified   [] Identified and Education Provided/Documented   [] Identified and Pt declined/was not appropriate     Cultureal, Taoist, OR Ethnic Dietary Needs:    [x] None Identified   [] Identified and Addressed     [x] Interdisciplinary Care Plan Reviewed/Documented    [x] Discharge Planning:  Comfort/hospice. PO as desired     MONITORING /EVALUATION:   Food/Nutrient Intake Outcomes:  Total energy intake  Physical Signs/Symptoms Outcomes: Weight/weight change    NUTRITION RISK:    [] High              [] Moderate           [x]  Low  []  Minimal/Uncompromised    PT SEEN FOR:    []  MD Consult: []Calorie Count      []Diabetic Diet Education        []Diet Education     []Electrolyte Management     []General Nutrition Management and Supplements     []Management of Tube Feeding     []TPN Recommendations    [x]  RN Referral:  [x]MST score >=2     []Enteral/Parenteral Nutrition PTA     []Pregnant: Gestational DM or Multigestation     [x]Pressure Ulcer/Wound Care needs        []  Low BMI  []  DTR Referral       Cassie Servin  Pager [phone]                 Weekend Pager [phone]

## 2017-03-07 NOTE — HOSPICE
[facility]  [phone]    Routine Nursing Note   Patient Name: Crissie Halsted  YOB: 1926  Age: 80 y.o. Date of Visit: 03/07/17  Facility of Care: [facility]  Patient Room: 1112/01     Hospice Attending: Barbara Sahni MD  Hospice Diagnosis: hospice  Sepsis [facility])    Level of Care: Routine    ASSESSMENT, PLAN AND INTERVENTIONS     1. Patient admitted to Routine Level of Care  2. Pt imminent, 02 sats trending down, hypotensive, cheny morrow breathing. 3.  Education of end of life process  4. Emotional support to family through end of life process. Spiritual Interventions: initial assessment done by  Kirstin Burrows. Family well connected to [employer], Yehuda Garcia and Klaudia Jackson are pastors, Auberneyda whaley deacon. Using chau to manage grief. Psych/ Social/ Emotional Interventions: family holding means    Care Coordination Needs: Dr Patricia Him and New Orders discussed / approved with Ana Gibbons MD.    Description History and Chart Review     List number of doses of PRN medications in last 24 hours:  Medication 1: morphine 2mg IV  Number of doses:  4    Medication 2: robinul 0.2mg IV  Number of doses:  2    Medication 3: ativan 0.5mg  Number of doses:  1    DISCHARGE PLANNING     1. Discharge Plan: home with hospice should pt stabilize  2. Patient/Family teaching: end of life education ie significant of cheyne morrow breathing  3. Response to patient/family teaching: expressed understanding    ASSESSMENT    KARNOFSKY: 10    Prognosis estimated based on 03/07/17 clinical assessment is:   [x] Few to Many Hours  [] Hours to Days   [] Few to Many Days   [] Days to Weeks   [] Few to Many Weeks   [] Weeks to Months   [] Few to Many Months    Quality Measure: Patient self-reports:  [] Yes    [x] No    ESAS:   Time of Assessment: 11:30  Pain (1-10):2  Fatigue (1-10): 0  Shortness of breath (1-10):2  Nausea (1-10): 0  Appetite (1-10):    Anxiety: (1-10):   Depression: (1-10):   Well-being: (1-10):   Constipation: _ Yes  _ No  LAST BM:     CLINICAL INFORMATION   Patient Vitals for the past 12 hrs:   Temp Pulse Resp BP SpO2   03/07/17 0422 97 °F (36.1 °C) 82 20 (!) 83/39 (!) 82 %       Currently this patient has:  [] Supplemental O2   [x] IV    [] PICC      [] PORT   [] NG Tube    [] PEG Tube   [] Ostomy     [x] Amaya draining __300_____ urine  [] Other:     SIGNS/PHYSICAL FINDINGS     Skin (including wound):  [] Warm, dry, supple, intact and color normal for race  [] Warm   [] Dry   [] Cool     [] Clammy       [] Diaphoretic    Turgor   [] Normal   [] Decreased  Color:   [] Pink   [] Pale   [] Cyanotic   [] Erythema   [] Jaundice   [x] Normal for Race  []  Wounds:    Neuro:  [] Lethargy  [] Restlessness / agitation  [] Confusion / delirium  [] Hallucinations  [] Responds to maximal stimulation  [x] Unresponsive  [] Seizures     Cardiac:  [] Dyspnea on Exertion  [] JVD  [] Murmur  [] Palpitations  [x] Hypotension  [] Hypertension  [] Tachycardia  [] Bradycardia  [] Irregular HR  [] Pulses Decreased  [] Pulses Absent  [] Edema:       (Location, Grade and Pitting)  [] Mottling:      (Location)    Respiratory:  Breath sounds:    [x] Diminished   [] Wheeze   [] Rhonchi   [] Rales   [x] Even and unlabored  [] Labored:            [] Cough   [] Non Productive   [] Productive    [] Description:           [] Deep suctioned   [] O2 at ___ LPM  [] High flow oxygen greater than 10 LPM  [] Bi-Pap    GI  [] Abdomen (describe)   [] Ascites  [] Nausea  [] Vomiting  [] Incontinent of bowels  [] Bowel sounds (yes/no)  [] Diarrhea  [] Constipation (see above including last bowel movement)  [] Checked for impaction  [] Last BM     Nutrition  Diet:___NPO_______  Appetite:   [] Good   [] Fair   [] Poor   [] Tube Feeding       [] Voiding  [] Incontinent   [x] Amaya    Musculoskeletal  [] Balance/[employer] [place] [employer] Unsteady   [] Weak   Strength:    [] Normal    [] Limited    [] Decreasing   Activities:    [] Up as tolerated   [x] Bedridden    [] Specify:    SAFETY  [x] 24 hr. Caregiver   [x] Side rails ?     [x] Hospital bed   [] Reviewed Falls & Safety     ALLERGIES AND MEDICATIONS     Allergies: No Known Allergies    Current Facility-Administered Medications   Medication Dose Route Frequency    LORazepam (ATIVAN) injection 0.5 mg  0.5 mg IntraVENous Q15MIN PRN    acetaminophen (TYLENOL) suppository 650 mg  650 mg Rectal Q4H PRN    scopolamine (TRANSDERM-SCOP) 1.5 mg  1.5 mg TransDERmal Q72H PRN    glycopyrrolate (ROBINUL) injection 0.2 mg  0.2 mg IntraVENous Q4H PRN    bisacodyl (DULCOLAX) suppository 10 mg  10 mg Rectal DAILY PRN    morphine injection 2 mg  2 mg IntraVENous Q15MIN PRN          Visit Time In: 12:30  Visit Time Out: 13:30

## 2017-03-07 NOTE — PROGRESS NOTES
Pressure Ulcer Prevention In basket Alert Received for Kana < 14 (moderate risk).      Suggested Care Plan/Interventions for Nursing    Patient is currently on an Envision on a [employer] bed frame for pressure redistribution. 1. Complete Kana Pressure Ulcer Risk Scale and use sub scores to identify appropriate interventions. 2. Perform Assessment: skin, changes in LOC, visual cues for pain, monitor skin under medical devices  3. Respond to Reduced Sensory Perception: changes in LOC, check visual cues for pain, float heels, suspension boots, pressure redistribution bed/mattress/chair cushion, turning and reposition approximately every 2 hours (pillows & wedges), pad between skin to skin, turn & reposition  4. Manage Moisture: absorbent under pads, internal / external urinary device, internal /  external fecal device, minimize layers, contain wound drainage, access need for specialty bed, limit adult briefs, maintain skin hydration (lotion/cream), moisture barrier, offer toileting every hour  5. Promote Activity: increase time out of bed, chair cushion, PT/OT evaluation, trapeze to reposition, pressure redistribution bed/mattress/chair  6. Address Reduced Mobility: float heels / suspension boot, HOB 30 degrees or less, pressure redistribution bed/mattress/cushion, PT / OT evaluation, turn and reposition approximately every 2 hours (pillows & wedges)  7. Promote Nutrition: document food / fluid / supplement intake, encourage/assist with meals as needed  8. Reduce Friction and Shear: transferring/repositioning devices (lift/draw sheet), lift team/ patient mobility team, feet elevated on foot rest, minimize layers, foam dressing / transparent film / skin sealants, protective barrier creams and emollients, transfer aides (board, [place] lift, ceiling lift, stand assist), HOB 30 degrees or less, trapeze to reposition.   Wound Care Team

## 2017-03-07 NOTE — PROGRESS NOTES
Oncology Nursing Communication Tool      7:35 PM  3/6/2017     Bedside shift change report given to SHAQUILLE Vidal (incoming nurse) by Estelle Gauthier (outgoing nurse) on [place]  a 80 y.o. female who was admitted on 3/6/2017  2:18 PM. Report included the following information SBAR, Kardex, Intake/Output, MAR and Recent Results. Significant changes during shift:       Issues for physician to address:             Code Status: DNR     Infections: No current active infections     Allergies: Review of patient's allergies indicates no known allergies. Current diet: DIET ONE TIME MESSAGE       Pain Controlled [] yes [] no   Bowel Movement [] yes [] no   Last Bowel Movement (date)                Vital Signs:   Patient Vitals for the past 12 hrs:   Temp Pulse Resp BP SpO2   03/06/17 1608 98.2 °F (36.8 °C) 90 24 95/40 92 %   03/06/17 0800 - - - - 95 %      Intake & Output:     Intake/Output Summary (Last 24 hours) at 03/06/17 1935  Last data filed at 03/06/17 1200   Gross per 24 hour   Intake                0 ml   Output              800 ml   Net             -800 ml      Laboratory Results:     Recent Results (from the past 12 hour(s))   GLUCOSE, POC    Collection Time: 03/06/17 11:53 AM   Result Value Ref Range    Glucose (POC) 105 (H) 65 - 100 mg/dL    Performed by Anisha Barraza               Opportunity for questions and clarifications were given to the incoming nurse. Patient's bed is in low position, side rails x2, door open PRN, call bell within reach and patient not in distress.       Estelle Gauthier

## 2017-03-07 NOTE — HOSPICE
[facility] LCSW Note:  Patient remains unresponsive, seems comfortable. With her are her daughter Gale Strong and son Romana Pedro, keeping means and sharing stories. They are coping well. Gale Strong noted that patient was so giving that even if the family did not have enough food for themselves, patient always had an \"open door\" policy to help others that were hungry or had nowhere else to go. Se Raven shared that her sister Derrick Cedeño is having difficulty emotionally coming in to the hospital to see patient in this state. Family are accepting of this decision. There is also a grandson in [place] who is unable to come here. Shared with Se Carbajal that they could call and speak to patient via phone and family could hold phone up to patient ear. Se Carbajal noted she would offer this. No other needs at this time for family. Comfort cart ordered for family.

## 2017-03-07 NOTE — HOSPICE
[facility]  [phone]    Inpatient Nursing Admission   Patient Name: Janette Sewell  YOB: 1926  Age: 80 y.o. Date of Hospice Admission: 3/6/2017  Hospice Attending: Jose Maki MD  Primary Care Physician: Guillaume Grimm MD  Admitting RN: Bakari Saavedra RN  : BRIGIDA Willams     Level of Care (GIP/Routine/Respite): Routine  Facility of Care: [facility]  Patient Room: [facility]/ transferred to room 1112. HOSPICE SUMMARY   ER Visits/ Hospitalizations in past year: One ED admission  12/23 ED/ADM for anemia, 3/5/17 sepsis  Hospice Diagnosis: hospice  Sepsis ([facility] [address]  Onset Date of Hospice Diagnosis: 03/06/2017    Co-Morbidities:   Patient Active Problem List   Diagnosis Code    Blood loss anemia D50.0    Counseling regarding advanced care planning and goals of care Z71.89    Weakness generalized R53.1    Debility R53.81    Alzheimer's disease G30.9    Multiple falls R29.6    Septic shock ([facility] A41.9, R65.21    ARF (acute renal failure) ([facility]) N17.9    Shock liver K72.00    Acidosis E87.2    UTI (urinary tract infection) N39.0    Sepsis ([facility]) A41.9     Principle Hospice Diagnosis: Sepsis  Diagnoses RELATED to the terminal prognosis: UTI, acute kidney failure, hyperkalemia, metabolic acidosis  Other Diagnoses: dementia, hypoalbuminemia, debility/bedbound, pressure ulcers     HOSPICE NARRATIVE COMPOSED BY PHYSICIAN   Rationale for a prognosis of life expectancy of 6 months or less if the disease follows its normal course:  Janette Sewell is a 80y.o. year old who was admitted to [facility]. The patient's principle diagnosis of sepsis has resulted in unresponsiveness and hospitalization since 2-5-16. Pt was admitted to [facility] with AMS and rapidly became unresponsive and hypotensive requiring ivf boluses, , iv abx and pressors. Not a dialysis candidate. Pt is now comfort care after discussions with Family and has been weaned off of pressors.  Functionally, the patient's Palliative Performance Scale has declined over a period of days and is estimated at 10. Objective information that support this patients limited prognosis includes: pt is nonresponsive. BP 75/40. Wbc 11.2 hb 8.9 Bun/cr 104. 3.3 Potassium 5.5. albumin 1.4  Urine cx--Gram negative rods. . The patient/family chose comfort measures with the support of Hospice    Prognosis estimated based on 03/06/17 clinical assessment is:   [x] Few to Many Hours  [x] Hours to Days   [] Few to Many Days   [] Days to Weeks   [] Few to Many Weeks   [] Weeks to Months   [] Few to Many Months    ADVANCE CARE PLANNING (Complete in ACP Flow Sheet)   Code Status: DNR  Durable DNR: [x]  Yes  []  No  Advance Care Planning 3/5/2017   Patient's Healthcare Decision Maker is: Legal Next of Nina 69   Primary Decision Maker Name José Miguel Cat   Primary Decision Maker Phone Number [phone]   Primary Decision Maker Relationship to Patient Adult child   Secondary Decision Maker Name [address] [place] [address] Phone Number [phone]   Secondary Decision Maker Relationship to Patient Adult child   Confirm Advance Directive Yes, on file        Service: [] Yes  [x]  No      [] Unknown  Appropriate for Pinning Ceremony:  [] Yes     [x] No  Advent: NO Worship    ASSESSMENT    Quality Measure: Patient self-reports:  []  Yes    [x] No    SYMPTOMS: pain:  Facial grimacing with movement and turning. Medicated x 1 with 2mg IV morphine with good relief. Family requesting that pt not be moved at this point. Increased respiratory effort with pain.     ESAS: _  Time of Assessment: 1300  Pain (1-10):3 with movement  Shortness of breath (1-10):2  Nausea (1-10): 0  Constipation: []  Yes [x] No Last BM 3/6/2017    FALL RISK:  [] Low   [x] Moderate    [] High    [] Not able to assess    KARNOFSKY: 10    PRESSURE ULCERS   Kana Scale (pressure ulcer risk): 8    FAST for all dementia:     Progression to DEPENDENCE WITH ADLs (include time frame): two weeks  [x] Dependent for bathing: personal hygiene and grooming   [x] Dependent for dressing: dressing and undressing   [x] Dependent for transferring: movement and mobility   [x] Dependent for toileting: continence-related tasks including control and hygiene   [x] Dependent for eating: preparing food and feeding    CLINICAL INFORMATION   Mid-arm Circumference (MAC):        Wt Readings from Last 3 Encounters:   03/05/17 53.4 kg (117 lb 11.6 oz)   12/30/16 51.7 kg (114 lb)   12/23/16 52.1 kg (114 lb 13.8 oz)     There is no height or weight on file to calculate BMI. There were no vitals taken for this visit. Currently this patient has:  [] Supplemental O2 [x] Peripheral IV  [] PICC    [] PORT   [x] Wolf Catheter [] NG Tube   [] PEG Tube [] Ostomy    [] AICD: Has ICD been deactivated? [] Yes [] No:______    SIGNS/PHYSICAL FINDINGS: T 98.6, , RR 24, O2 sat's 95%, BP 75/40  , hypotensive, unresponsive      LAB VALUES  No results found for this visit on 03/06/17 (from the past 12 hour(s)). No results found for this visit on 03/06/17 (from the past 6 hour(s)). Lab Results   Component Value Date/Time    Protein, total 6.0 03/06/2017 04:38 AM    Albumin 1.4 03/06/2017 04:38 AM       ALLERGIES AND MEDICATIONS     Allergies: No Known Allergies  Current Facility-Administered Medications   Medication Dose Route Frequency    LORazepam (ATIVAN) injection 0.5 mg  0.5 mg IntraVENous Q15MIN PRN    acetaminophen (TYLENOL) suppository 650 mg  650 mg Rectal Q4H PRN    scopolamine (TRANSDERM-SCOP) 1.5 mg  1.5 mg TransDERmal Q72H PRN    glycopyrrolate (ROBINUL) injection 0.2 mg  0.2 mg IntraVENous Q4H PRN    bisacodyl (DULCOLAX) suppository 10 mg  10 mg Rectal DAILY PRN    morphine injection 2 mg  2 mg IntraVENous Q15MIN PRN       ASSESSMENT & PLAN     1.  Patient is hypotensive, BP 75/40 with fluid bolus and dopamine drip, discontinue IV fluids, keep patient comfortable, wolf inserted for urine, turn and reposition patient every four hours. 2. Support family as patient transitions, family is chau based, although tearful they are mindful of the process and accepting of decline. CAREGIVER SUPPORT:  [x] Provided information on End of Life Care   [x] Material Provided: Kaylyn Braswell From My [facility]ight or [facility]     1. FALL INTERVENTIONS PROVIDED (if the fall risk is >10,  the intervention below was provided)  [x] Implement/recommend assistive devices and encouraged their use (use full bed rails, etc)  [] Implemented/recommended resources for alar, system (personal alarm, bed alarm, call bell, etc)  [x] Implemented/recommended environmental changes (remove hazards, lower bed, improper lighting, etc.)  [] Implemented/recommended increased supervision/assistance  [] Scheduled PT and/or OT evaluation for mobility/transfer techniques and/or assistive device recommendations    2. Initial Bereavement POC  [x] LOW RISK Indications: normal grief, good support, opens expression. [] MODERATE RISK Indications: grief normal but severe, depression, somatic distress, low support  [] HIGH RISK indications: Hx of mental illness, suicidal ideation, depression, somatic distress, excessive guilt or anger, multiple losses, other life crisis. 3. Oxygen SAFETY and Anticoagulation SAFETY: Only if being transferred to home environment    4. Copies of Election of Benefit and Hospice Consent:  [x] Hospice Folder Provided  [x] See Electronic Health Records for past medical records    5. Spiritual Issues Identified Both son and daughter are pastors with local [employer] affiliation    6. Psych/ Social/ Emotional Issues Identified: Daughter remembers her Grandfather dying at home when she was a young age, this image and memory of her Grandfather being placed in a bag has stuck with her.      7.  Care Coordination:   Dr. Ulises Rico contacted for medication reconciliation, hospice services and treatment  MEDS: See medication list above  DME: Per hospital/hospice house  Supplies: Per hospital/hospice house  Sheryl morrell [facility]: [facility]    9. Hospice Team Orders  [x] Skilled Nurse -  Every other day while admitted to hospital  [x] MSW  1 visit and then prn for assessment/evaluation for family support and need for volunteer services  [x]   1 visit and then prn for assessment/evaluation for spiritual support.   services will contact / Clinical Manager for further orders  [x] Hospice Aide to be evaluated by  (if patient being transferred to home environment)

## 2017-03-07 NOTE — HOSPICE
The  found the patient lying in bed unresponsive with her family in the family room talking. The family engaged in life review and sharing about their spiritual journeys and ministry. The  offered active listening as they shared,. The  and family discussed end-of-life concerns. All are accepting of the patient's impending death and appear to be grieving appropriately. The  prayed with the patient. The  offered spiritual support through active listening, end-of-life discussions and prayer. No change in acuity, frequency or care plan.

## 2017-03-07 NOTE — PROGRESS NOTES
Brief Nutrition Note    Chart reviewed. Nutrition referral triggered due to pressure ulcers. Pt admitted to hospice with comfort care. No nutrition intervention at this time. Please consult if needed.     Susannah Hernandez RDN  Pager: [phone]

## 2017-03-07 NOTE — PROGRESS NOTES
Physical Therapy Screening:  Services are not indicated at this time. Currently IP hospice. [facility] for [facility] therapy based on results obtained during the nursing admission assessment. The patients chart was reviewed and the patient is not appropriate for a skilled therapy evaluation at this time. Please consult physical therapy if any therapy needs arise. Thank you.     Masood Kumari, PT, DPT

## 2017-03-08 NOTE — PROGRESS NOTES
Oncology Nursing Communication Tool      7:48 PM  3/7/2017     Bedside shift change report given to [facility], RN (incoming nurse) by Lakisha Campos (outgoing nurse) on Crissie Halsted a 80 y.o. female who was admitted on 3/6/2017  2:18 PM. Report included the following information SBAR, Kardex, Intake/Output, MAR and Recent Results. Significant changes during shift:       Issues for physician to address:            Code Status: DNR     Infections: No current active infections     Allergies: Review of patient's allergies indicates no known allergies. Current diet: DIET ONE TIME MESSAGE       Pain Controlled [] yes [] no   Bowel Movement [] yes [] no   Last Bowel Movement (date)                Vital Signs:   No data found. Intake & Output:     Intake/Output Summary (Last 24 hours) at 03/07/17 1948  Last data filed at 03/07/17 1530   Gross per 24 hour   Intake                0 ml   Output             1190 ml   Net            -1190 ml      Laboratory Results:   No results found for this or any previous visit (from the past 12 hour(s)). Opportunity for questions and clarifications were given to the incoming nurse. Patient's bed is in low position, side rails x2, door open PRN, call bell within reach and patient not in distress.       Lakisha Campos

## 2017-03-08 NOTE — PROGRESS NOTES
0730 Bedside Report received from offgoing nurse  1150 Daughter at bedside   Bedside and Verbal shift change report given to  oncoming nurse Report included the following information SBAR, Kardex, Intake/Output, MAR and Recent Results.

## 2017-03-09 NOTE — HOSPICE
[facility] LCSW Note: Patient generally unresponsive, appears to be slightly restless, shakes head on pillow from time to time. No family in room at this time. Will check again later this afternoon. RN on floor, Chrissy Krishnamurthy noted that patient is very uncomfortable when turned or repositioned in bed. She reacts with much restlessness and agitation, the turning has been limited and may be discontinued.

## 2017-03-09 NOTE — HOSPICE
[facility]  [phone]    Patient Name: Willie Olivarez  YOB: 1926    Date of Provider Hospice Visit: 03/08/17    Level of Care:   [] General Inpatient (GIP)    [x] Routine   [] Respite    Location of Care:  [] [facility] [] [facility] [x] [facility] [] [facility] [] Hospice [facility]  [] Home [] Other:      Date of Hospice Admission: 3-6-17  Hospice Attending: Dr Edward Bautista Diagnosis: Sepsis  Diagnoses RELATED to the terminal prognosis: UTI, acute kidney failure, hyperkalemia, metabolic acidosis  Other Diagnoses: dementia, hypoalbuminemia, debility/bedbound, pressure ulcers      HOSPICE NARRATIVE COMPOSED BY PHYSICIAN   Rationale for a prognosis of life expectancy of 6 months or less if the disease follows its normal course:  Willie Olivarez is a 80y.o. year old who was admitted to [facility]. The patient's principle diagnosis of sepsis has resulted in unresponsiveness and hospitalization since 2-5-16. Pt was admitted to [facility] with AMS and rapidly became unresponsive and hypotensive requiring ivf boluses, , iv abx and pressors. Not a dialysis candidate. Pt is now comfort care after discussions with Family and has been weaned off of pressors. Functionally, the patient's Palliative Performance Scale has declined over a period of days and is estimated at 10. Objective information that support this patients limited prognosis includes: pt is nonresponsive. BP 75/40. Wbc 11.2 hb 8.9 Bun/cr 104. 3.3 Potassium 5.5. albumin 1.4  Urine cx--Gram negative rods.     . The patient/family chose comfort measures with the support of Hospice       HOSPICE DIAGNOSES   Active Symptoms:  1. Pain from pressure ulcers  2. restlessness     PLAN   1. Admitted hospice at routine level of care/comfort bed. Pt is unstable for transport, hemodynamically unstable. 2. Medicate as need for pain or dyspnea or restlessness    3.  and SW to support family needs  4.  Disposition: to home with hospice if condition stabilizes. Pt was cared for at home by her 2 daughters PTA    Prognosis estimated based on 03/08/17 clinical assessment is:   [x] Few to Many Hours  [] Few to Many Days    [] Few to Many Weeks    [] Few to Many Months    Communicated plan of care with: Hospice Case Manager;  Hospice IDT; Care Team     GOALS OF CARE     Resuscitation Status: DNR  Durable DNR: [] Yes [] No    Advance Care Planning 3/6/2017   Patient's Healthcare Decision Maker is: Legal Next of Nina 69   Primary Decision Maker Name -   Primary Decision Maker Phone Number -   Primary Decision Maker Relationship to Patient -   Secondary Decision Maker Name -   Secondary Decision [address] Phone Number -   Secondary Decision Maker Relationship to Patient -   Confirm Advance Directive Yes, on file        HISTORY     History obtained from: chart, hospice RN and son and daughter    CHIEF COMPLAINT:   The patient is:   [] Verbal  [x] Nonverbal  [x] Unresponsive    HPI/SUBJECTIVE:      Has remained unresponsive but some twisting and turning of neck noted this evening       REVIEW OF SYSTEMS     The following systems were: [] reviewed  [x] unable to be reviewed    Positive ROS include:  Constitutional:  Ears/nose/mouth/throat:  Respiratory:  Gastrointestinal:  Musculoskeletal:  Neurologic:  Psychiatric:  Endocrine:     Adult Non-Verbal Pain Assessment Score: 3    Face  [] 0   No particular expression or smile  [x] 1   Occasional grimace, tearing, frowning, wrinkled forehead  [] 2   Frequent grimace, tearing, frowning, wrinkled forehead    Activity (movement)  [] 0   Lying quietly, normal position  [] 1   Seeking attention through movement or slow, cautious movement  [x] 2   Restless, excessive activity and/or withdrawal reflexes    Guarding  [x] 0   Lying quietly, no positioning of hands over areas of body  [] 1   Splinting areas of the body, tense  [] 2   Rigid, stiff    Physiology (vital signs)  [x] 0   Stable vital signs  [] 1 Change in any of the following: SBP > 20mm Hg; HR > 20/minute  [] 2   Change in any of the following: SBP > 30mm Hg; HR > 25/minute    Respiratory  [x] 0   Baseline RR/SpO2, compliant with ventilator  [] 1   RR > 10 above baseline, or 5% drop SpO2, mild asynchrony with ventilator  [] 2   RR > 20 above baseline, or 10% drop SpO2, asynchrony with ventilator     FUNCTIONAL ASSESSMENT     Palliative Performance Scale (PPS): 10     PSYCHOSOCIAL/SPIRITUAL ASSESSMENT     Active Problems:    Sepsis ([address] (3/6/2017)      Past Medical History:   Diagnosis Date    Dementia     Hypertension       No past surgical history on file.    Social History   Substance Use Topics    Smoking status: Former Smoker    Smokeless tobacco: Not on file    Alcohol use No     Family History   Problem Relation Age of Onset    Heart Disease Mother     Cancer Father       No Known Allergies   Current Facility-Administered Medications   Medication Dose Route Frequency    LORazepam (ATIVAN) injection 0.5 mg  0.5 mg IntraVENous Q15MIN PRN    acetaminophen (TYLENOL) suppository 650 mg  650 mg Rectal Q4H PRN    scopolamine (TRANSDERM-SCOP) 1.5 mg  1.5 mg TransDERmal Q72H PRN    glycopyrrolate (ROBINUL) injection 0.2 mg  0.2 mg IntraVENous Q4H PRN    bisacodyl (DULCOLAX) suppository 10 mg  10 mg Rectal DAILY PRN    morphine injection 2 mg  2 mg IntraVENous Q15MIN PRN        PHYSICAL EXAM     Wt Readings from Last 3 Encounters:   03/05/17 53.4 kg (117 lb 11.6 oz)   12/30/16 51.7 kg (114 lb)   12/23/16 52.1 kg (114 lb 13.8 oz)       Visit Vitals    /42    Pulse 78    Temp 98.6 °F (37 °C)    Resp 18    SpO2 96%       Supplemental O2  [] Yes  [x] NO  Last bowel movement:     Currently this patient has:  [x] Peripheral IV [] PICC  [] PORT [] ICD    [x] Amaya Catheter [] NG Tube   [] PEG Tube    [] Rectal Tube [] Drain  [] Other:     Constitutional: pt is laying in bed, eye remain closed, no verbalization, some shaking of arms  Eyes: perrl  ENMT: althea-clear  Cardiovascular: rrr  Respiratory: clear, regular respirations  Gastrointestinal: soft  Musculoskeletal: arms are both tense, slight shaking  Skin: warm, dry, pressure ulcers to heels/ankle covered with dressing and boots.  Sacral ulcers were not viewed  Neurologic: nonresponsive  Psychiatric:   Other:    Pertinent Lab and or Imaging Tests:  Lab Results   Component Value Date/Time    Sodium 140 03/06/2017 04:38 AM    Potassium 5.5 03/06/2017 04:38 AM    Chloride 107 03/06/2017 04:38 AM    CO2 16 03/06/2017 04:38 AM    Anion gap 17 03/06/2017 04:38 AM    Glucose 58 03/06/2017 04:38 AM     03/06/2017 04:38 AM    Creatinine 3.37 03/06/2017 04:38 AM    BUN/Creatinine ratio 31 03/06/2017 04:38 AM    GFR est AA 16 03/06/2017 04:38 AM    GFR est non-AA 13 03/06/2017 04:38 AM    Calcium 7.4 03/06/2017 04:38 AM     Lab Results   Component Value Date/Time    Protein, total 6.0 03/06/2017 04:38 AM    Albumin 1.4 03/06/2017 04:38 AM           Total time: 15 min  Counseling / coordination time:met with daughter in room, d/w hospice RN   > 50% counseling / coordination?: no

## 2017-03-09 NOTE — PROGRESS NOTES
Oncology Nursing Communication Tool      7:49 PM  3/8/2017     Bedside shift change report given to [facility], RN (incoming nurse) by Yaron Deras RN (outgoing nurse) on Henrique An a 80 y.o. female who was admitted on 3/6/2017  2:18 PM. Report included the following information SBAR. Significant changes during shift:         Issues for physician to address:               Code Status: DNR     Infections: No current active infections     Allergies: Review of patient's allergies indicates no known allergies. Current diet: DIET ONE TIME MESSAGE       Pain Controlled [x] yes [] no   Bowel Movement [] yes [x] no   Last Bowel Movement (date)                   Vital Signs:   Patient Vitals for the past 12 hrs:   Temp Pulse Resp BP SpO2   03/08/17 1151 98.6 °F (37 °C) 78 18 106/42 96 %      Intake & Output:     Intake/Output Summary (Last 24 hours) at 03/08/17 1949  Last data filed at 03/08/17 0444   Gross per 24 hour   Intake                0 ml   Output              625 ml   Net             -625 ml      Laboratory Results:   No results found for this or any previous visit (from the past 12 hour(s)). Opportunity for questions and clarifications were given to the incoming nurse. Patient's bed is in low position, side rails x2, door open PRN, call bell within reach and patient not in distress.       Yaron Deras RN

## 2017-03-10 NOTE — PROGRESS NOTES
Mobility team arrived at room to reposition patient; asked the patient's son if he would like the patient repositioned. The patient's son stated \"I want her kept comfortable. I do not want her repositioned at all. \"  Patient/family education provided on pressure ulcer risk factors such as immobility, nutritional status, and end of life, as well as pressure ulcer prevention methods including frequent repositioning and keeping skin clean and dry. The patient's son verbalized understanding of teaching provided and reiterated that he did not want her moved at all. Will continue to offer repositioning and turning throughout the shift and will continue to reinforce education.

## 2017-03-10 NOTE — HOSPICE
[facility]  [phone]    [facility] Daily Nursing Note   Patient Name: Bea Mcconnell  YOB: 1926  Age: 80 y.o. Date of Visit: 03/10/17  Facility of Care: [facility]   Patient Room: 1112/01     Hospice Attending: Danielle Avalos MD  Hospice Diagnosis: hospice  Sepsis [facility])    Level of Care: GIP    Current GIP Symptoms    1. Restlessness, agitation, increased secretions        ASSESSMENT & PLAN   Must update Plan of Care including visit frequencies for IDT members  1. Pt remains GIP for symptom management of pain, restlessness. Overnight her IV started to leak and was discontinued, she has been switched to sublingual medications and they do not seem to be as effective for her. Per family she was more comfortable yesterday. Pt restless in bed, unresponsive, moving right shoulder up and down and moving her head back and forth. Her breathing during these episodes significantly increases as well. Family has been advised that the efficacy of medications is different between oral and IV. Medication frequency increased, dose remains the same; if this does not help the pt to be more comfortable the family is not opposed to reinserting the IV. Spiritual Interventions: None     Psych/ Social/ Emotional Interventions: Offered comfort and support, active listening to stories about her life    Care Coordination Needs: none    Care plan and New Orders discussed / approved with Dr Gillermina Sandifer MD.    Description History and Chart Review   If this is initial GIP note must document RN assessment/MD communication in previous setting. Specifically document nursing/medication needs in last 24 hours to support GIP care  Narrative History of last 24 hours that demonstrates care cannot be provided in another setting:  Pt was admitted routine, however she became increasingly more uncomfortable and was changed to GIP.  Yesterday she was requiring IV PRN medications to control her pain, respiratory distress and agitation. Overnight she lost IV access and is now using sublingual medications, however she is not as comfortable as she was yesterday. Medications will be increased in dose frequency not amount to see if that allows her to be more comfortable. What has been done to control the patient's symptoms in the last 24 hours? Medication adjustments     Does the patient currently require IV medications? No due to lost access  Does the patient currently require scheduled medications? yes  Does the patient currently require a PCA? no    List number of doses of PRN medications in last 24 hours:  Medication 1: Scopolamine  Number of doses: 1 patch placed. Medication 2: Lorazepam 1mg  Number of doses: 3 doses     Medication 3: Morphine 2mg  Number of doses: 1    Supporting documentation for GIP need for pain control:  [x] Frequent evaluation by a doctor, nurse practitioner, nurse   [x] Frequent medication adjustment    [] IVs that cannot be administered at home   [x] Aggressive pain management   [] Complicated technical delivery of medications              Supporting documentation for GIP need for symptom control:  [x]  Sudden decline necessitating intensive nursing intervention  []  Uncontrolled / intractable nausea or vomiting   []  Pathological fractures  []  Advanced open wounds requiring frequent skilled care  [x] Unmanageable respiratory distress  [] New or worsening delirium   [] Delirium with behavior issues: Is 24 hour caregiver present due to safety concerns with agitation? (yes/no)  [] Imminent death  with skilled nursing needs documented above    DISCHARGE PLANNING   Daily discharge planning required for GIP  1. Discharge Plan: Home with hospice if her symptoms are managed, she will most likely pass in the hospital setting   2. Patient/Family teaching: Medication efficacy  3.  Response to patient/family teaching: verbalized clear understanding     ASSESSMENT    KARNOFSKY: 10    Prognosis estimated based on 03/10/17 clinical assessment is:   [] Few to Many Hours  [x] Hours to Days   [] Few to Many Days   [] Days to Weeks   [] Few to Many Weeks   [] Weeks to Months   [] Few to Many Months    Quality Measure: Patient self-reports:  [] Yes    [x] No    ESAS:   Time of Assessment: 1200  Pain (1-10): 6/10  Fatigue (1-10): 8/10  Shortness of breath (1-10): 5/10  Nausea (1-10): 0  Appetite (1-10): 0  Anxiety: (1-10): 4/10  Depression: (1-10):   Well-being: (1-10):   Constipation: _ Yes  _ No  LAST BM:     CLINICAL INFORMATION   Patient Vitals for the past 12 hrs:   Temp Pulse Resp BP SpO2   03/10/17 0529 97.8 °F (36.6 °C) 77 16 99/41 92 %       Currently this patient has:  [x] Supplemental O2   [] IV    [] PICC      [] PORT   [] NG Tube    [] PEG Tube   [] Ostomy     [x] Amaya draining _______ urine  [] Other:     SIGNS/PHYSICAL FINDINGS     Skin (including wound):  [] Warm, dry, supple, intact and color normal for race  [x] Warm   [] Dry   [] Cool     [] Clammy       [] Diaphoretic    Turgor   [x] Normal   [] Decreased  Color:   [x] Pink   [] Pale   [] Cyanotic   [] Erythema   [] Jaundice   [x] Normal for Race  []  Wounds:    Neuro:  [x] Lethargy  [x] Restlessness / agitation  [] Confusion / delirium  [] Hallucinations  [] Responds to maximal stimulation  [x] Unresponsive  [] Seizures     Cardiac:  [x] Dyspnea on Exertion  [] JVD  [] Murmur  [] Palpitations  [] Hypotension  [] Hypertension  [] Tachycardia  [] Bradycardia  [] Irregular HR  [] Pulses Decreased  [] Pulses Absent  [] Edema:       (Location, Grade and Pitting)  [] Mottling:      (Location)    Respiratory:  Breath sounds:    [x] Diminished   [] Wheeze   [] Rhonchi   [] Rales   [] Even and unlabored  [x] Labored:   With periods of head and shoulder movement         [] Cough   [] Non Productive   [] Productive    [] Description:           [] Deep suctioned   [x] O2 at _2__ LPM PRN  [] High flow oxygen greater than 10 LPM  [] Bi-Pap    GI  [] Abdomen (describe)   [] Ascites  [] Nausea  [] Vomiting  [x] Incontinent of bowels  [] Bowel sounds (yes/no)  [] Diarrhea  [] Constipation (see above including last bowel movement)  [] Checked for impaction  [] Last BM     Nutrition  Diet:___NPO_______  Appetite:   [] Good   [] Fair   [x] Poor   [] Tube Feeding       [] Voiding  [x] Incontinent   [] Amyaa    Musculoskeletal  [] Balance/Capitan Unsteady   [] Weak   Strength:    [] Normal    [] Limited    [x] Decreasing   Activities:    [] Up as tolerated   [x] Bedridden    [] Specify:    SAFETY  [x] 24 hr. Caregiver   [x] Side rails ?     [x] Hospital bed   [] Reviewed Falls & Safety       ALLERGIES AND MEDICATIONS     Allergies: No Known Allergies    Current Facility-Administered Medications   Medication Dose Route Frequency    LORazepam (INTENSOL) 2 mg/mL oral concentrate 1 mg  1 mg SubLINGual Q4H    morphine (ROXANOL) 100 mg/5 mL (20 mg/mL) concentrated solution 2 mg  2 mg SubLINGual Q4H    LORazepam (INTENSOL) 2 mg/mL oral concentrate 0.5 mg  0.5 mg SubLINGual Q15MIN PRN    morphine (ROXANOL) 100 mg/5 mL (20 mg/mL) concentrated solution 2 mg  2 mg SubLINGual Q15MIN PRN    acetaminophen (TYLENOL) suppository 650 mg  650 mg Rectal Q4H PRN    scopolamine (TRANSDERM-SCOP) 1.5 mg  1.5 mg TransDERmal Q72H PRN    glycopyrrolate (ROBINUL) injection 0.2 mg  0.2 mg IntraVENous Q4H PRN    bisacodyl (DULCOLAX) suppository 10 mg  10 mg Rectal DAILY PRN          Visit Time In: 1200  Visit Time Out: 2098

## 2017-03-10 NOTE — HOSPICE
[address] [place] [address] [facility]  [phone]    Patient Name: Sergey Amato  YOB: 1926    Date of Provider Hospice Visit: 03/10/17    Level of Care:   [] General Inpatient (GIP)    [x] Routine   [] Respite    Location of Care:  [] [facility] [] [facility] [x] [facility] [] [facility] [] Hospice [facility]  [] Home [] Other:      Date of Hospice Admission: 3-6-17  Hospice Attending: Dr Krystal Muñoz Diagnosis: Sepsis  Diagnoses RELATED to the terminal prognosis: UTI, acute kidney failure, hyperkalemia, metabolic acidosis  Other Diagnoses: dementia, hypoalbuminemia, debility/bedbound, pressure ulcers      HOSPICE NARRATIVE COMPOSED BY PHYSICIAN   Rationale for a prognosis of life expectancy of 6 months or less if the disease follows its normal course:  Sergey Amato is a 80y.o. year old who was admitted to [facility] [place]. The patient's principle diagnosis of sepsis has resulted in unresponsiveness and hospitalization since 3-5-16. Pt was admitted to [facility] with AMS and rapidly became unresponsive and hypotensive requiring ivf boluses, , iv abx and pressors. Not a dialysis candidate. Pt is now comfort care after discussions with Family and has been weaned off of pressors. Functionally, the patient's Palliative Performance Scale has declined over a period of days and is estimated at 10. Objective information that support this patients limited prognosis includes: pt is nonresponsive. BP 75/40. Wbc 11.2 hb 8.9 Bun/cr 104. 3.3 Potassium 5.5. albumin 1.4  Urine cx--Gram negative rods.     . The patient/family chose comfort measures with the support of Hospice       HOSPICE DIAGNOSES   Active Symptoms:  1. Pain from pressure ulcers  2. restlessness     PLAN   1. Admitted hospice at routine level of care/comfort bed. Pt is unstable for transport, hemodynamically unstable. 2. Initiate scheduled morphine and ativan today for pain or dyspnea or restlessness  3. Change status to GIP on 3-9-17  4.  IV infiltrated this morning--now roxanol 5mg ql q3 and ativan sl q3    5.  and SW to support family needs  6. Disposition: to home with hospice if condition stabilizes. Pt was cared for at home by her 2 daughters PTA    Prognosis estimated based on 03/10/17 clinical assessment is:   [x] Few to Many Hours  [] Few to Many Days    [] Few to Many Weeks    [] Few to Many Months    Communicated plan of care with: Hospice Case Manager; Hospice IDT; Care Team     GOALS OF CARE     Resuscitation Status: DNR  Durable DNR: [] Yes [] No    Advance Care Planning 3/6/2017   Patient's Healthcare Decision Maker is: Legal Next of Kin   Primary Decision Maker Name -   Primary Decision Maker Phone Number -   Primary Decision Maker Relationship to Patient -   Secondary Decision Maker Name -   Secondary Decision [address] Phone Number -   Secondary Decision Maker Relationship to Patient -   Confirm Advance Directive Yes, on file        HISTORY     History obtained from: chart, hospice RN and son and daughter    CHIEF COMPLAINT:   The patient is:   [] Verbal  [x] Nonverbal  [x] Unresponsive    HPI/SUBJECTIVE:      Still very restless moving shoulders and head back and forth    IV infiltrated this morning.        REVIEW OF SYSTEMS     The following systems were: [] reviewed  [x] unable to be reviewed    Positive ROS include:  Constitutional:  Ears/nose/mouth/throat:  Respiratory:  Gastrointestinal:  Musculoskeletal:  Neurologic:  Psychiatric:  Endocrine:     Adult Non-Verbal Pain Assessment Score: 3    Face  [] 0   No particular expression or smile  [x] 1   Occasional grimace, tearing, frowning, wrinkled forehead  [] 2   Frequent grimace, tearing, frowning, wrinkled forehead    Activity (movement)  [] 0   Lying quietly, normal position  [] 1   Seeking attention through movement or slow, cautious movement  [x] 2   Restless, excessive activity and/or withdrawal reflexes    Guarding  [x] 0   Lying quietly, no positioning of hands over areas of body  [] 1   Splinting areas of the body, tense  [] 2   Rigid, stiff    Physiology (vital signs)  [x] 0   Stable vital signs  [] 1   Change in any of the following: SBP > 20mm Hg; HR > 20/minute  [] 2   Change in any of the following: SBP > 30mm Hg; HR > 25/minute    Respiratory  [x] 0   Baseline RR/SpO2, compliant with ventilator  [] 1   RR > 10 above baseline, or 5% drop SpO2, mild asynchrony with ventilator  [] 2   RR > 20 above baseline, or 10% drop SpO2, asynchrony with ventilator     FUNCTIONAL ASSESSMENT     Palliative Performance Scale (PPS): 10     PSYCHOSOCIAL/SPIRITUAL ASSESSMENT     Active Problems:    Sepsis ([address] (3/6/2017)      Past Medical History:   Diagnosis Date    Dementia     Hypertension       No past surgical history on file.    Social History   Substance Use Topics    Smoking status: Former Smoker    Smokeless tobacco: Not on file    Alcohol use No     Family History   Problem Relation Age of Onset    Heart Disease Mother     Cancer Father       No Known Allergies   Current Facility-Administered Medications   Medication Dose Route Frequency    LORazepam (INTENSOL) 2 mg/mL oral concentrate 1 mg  1 mg SubLINGual Q3H    morphine (ROXANOL) 100 mg/5 mL (20 mg/mL) concentrated solution 5 mg  5 mg Oral Q3H    LORazepam (INTENSOL) 2 mg/mL oral concentrate 0.5 mg  0.5 mg SubLINGual Q15MIN PRN    morphine (ROXANOL) 100 mg/5 mL (20 mg/mL) concentrated solution 2 mg  2 mg SubLINGual Q15MIN PRN    acetaminophen (TYLENOL) suppository 650 mg  650 mg Rectal Q4H PRN    scopolamine (TRANSDERM-SCOP) 1.5 mg  1.5 mg TransDERmal Q72H PRN    glycopyrrolate (ROBINUL) injection 0.2 mg  0.2 mg IntraVENous Q4H PRN    bisacodyl (DULCOLAX) suppository 10 mg  10 mg Rectal DAILY PRN        PHYSICAL EXAM     Wt Readings from Last 3 Encounters:   03/05/17 117 lb 11.6 oz (53.4 kg)   12/30/16 114 lb (51.7 kg)   12/23/16 114 lb 13.8 oz (52.1 kg)       Visit Vitals    BP 99/41 (BP 1 Location: Right arm, BP Patient Position: At rest)    Pulse 77    Temp 97.8 °F (36.6 °C)    Resp 16    SpO2 92%       Supplemental O2  [] Yes  [x] NO  Last bowel movement:     Currently this patient has:  [x] Peripheral IV [] PICC  [] PORT [] ICD    [x] Amaya Catheter [] NG Tube   [] PEG Tube    [] Rectal Tube [] Drain  [] Other:     Constitutional: pt is laying in bed, eye remain closed, no verbalization, some shaking of arms  Eyes: perrl  ENMT: althea-clear  Cardiovascular: rrr  Respiratory: clear, regular respirations  Gastrointestinal: soft  Musculoskeletal: arms are both tense, slight shaking  Skin: warm, dry, pressure ulcers to heels/ankle covered with dressing and boots.  Sacral ulcers were not viewed  Neurologic: nonresponsive  Psychiatric:   Other:    Pertinent Lab and or Imaging Tests:  Lab Results   Component Value Date/Time    Sodium 140 03/06/2017 04:38 AM    Potassium 5.5 03/06/2017 04:38 AM    Chloride 107 03/06/2017 04:38 AM    CO2 16 03/06/2017 04:38 AM    Anion gap 17 03/06/2017 04:38 AM    Glucose 58 03/06/2017 04:38 AM     03/06/2017 04:38 AM    Creatinine 3.37 03/06/2017 04:38 AM    BUN/Creatinine ratio 31 03/06/2017 04:38 AM    GFR est AA 16 03/06/2017 04:38 AM    GFR est non-AA 13 03/06/2017 04:38 AM    Calcium 7.4 03/06/2017 04:38 AM     Lab Results   Component Value Date/Time    Protein, total 6.0 03/06/2017 04:38 AM    Albumin 1.4 03/06/2017 04:38 AM           Total time: 20 min  Counseling / coordination time:met with daughter, d/w hospice RN   > 50% counseling / coordination?: no

## 2017-03-10 NOTE — HOSPICE
[facility]  [phone]    Patient Name: Manuelito Tsang  YOB: 1926    Date of Provider Hospice Visit: 03/09/17    Level of Care:   [] General Inpatient (GIP)    [x] Routine   [] Respite    Location of Care:  [] [facility] [] [facility] [x] [facility] [] [facility] [] Hospice [facility]  [] Home [] Other:      Date of Hospice Admission: 3-6-17  Hospice Attending: Dr Daria Engle Diagnosis: Sepsis  Diagnoses RELATED to the terminal prognosis: UTI, acute kidney failure, hyperkalemia, metabolic acidosis  Other Diagnoses: dementia, hypoalbuminemia, debility/bedbound, pressure ulcers      HOSPICE NARRATIVE COMPOSED BY PHYSICIAN   Rationale for a prognosis of life expectancy of 6 months or less if the disease follows its normal course:  Manuelito Tsang is a 80y.o. year old who was admitted to [address] [place] [address]. The patient's principle diagnosis of sepsis has resulted in unresponsiveness and hospitalization since 2-5-16. Pt was admitted to [facility] with AMS and rapidly became unresponsive and hypotensive requiring ivf boluses, , iv abx and pressors. Not a dialysis candidate. Pt is now comfort care after discussions with Family and has been weaned off of pressors. Functionally, the patient's Palliative Performance Scale has declined over a period of days and is estimated at 10. Objective information that support this patients limited prognosis includes: pt is nonresponsive. BP 75/40. Wbc 11.2 hb 8.9 Bun/cr 104. 3.3 Potassium 5.5. albumin 1.4  Urine cx--Gram negative rods.     . The patient/family chose comfort measures with the support of Hospice       HOSPICE DIAGNOSES   Active Symptoms:  1. Pain from pressure ulcers  2. restlessness     PLAN   1. Admitted hospice at routine level of care/comfort bed. Pt is unstable for transport, hemodynamically unstable. 2. Initiate scheduled morphine and ativan today for pain or dyspnea or restlessness  3. Change status to GIP    4.   and SW to support family needs  5. Disposition: to home with hospice if condition stabilizes. Pt was cared for at home by her 2 daughters PTA    Prognosis estimated based on 03/09/17 clinical assessment is:   [x] Few to Many Hours  [] Few to Many Days    [] Few to Many Weeks    [] Few to Many Months    Communicated plan of care with: Hospice Case Manager;  Hospice IDT; Care Team     GOALS OF CARE     Resuscitation Status: DNR  Durable DNR: [] Yes [] No    Advance Care Planning 3/6/2017   Patient's Healthcare Decision Maker is: Legal Next of Nina 69   Primary Decision Maker Name -   Primary Decision Maker Phone Number -   Primary Decision Maker Relationship to Patient -   Secondary Decision Maker Name -   Secondary Decision [address] Phone Number -   Secondary Decision Maker Relationship to Patient -   Confirm Advance Directive Yes, on file        HISTORY     History obtained from: chart, hospice RN and son and daughter    CHIEF COMPLAINT:   The patient is:   [] Verbal  [x] Nonverbal  [x] Unresponsive    HPI/SUBJECTIVE:      Increased pain and restlessness since last night       REVIEW OF SYSTEMS     The following systems were: [] reviewed  [x] unable to be reviewed    Positive ROS include:  Constitutional:  Ears/nose/mouth/throat:  Respiratory:  Gastrointestinal:  Musculoskeletal:  Neurologic:  Psychiatric:  Endocrine:     Adult Non-Verbal Pain Assessment Score: 3    Face  [] 0   No particular expression or smile  [x] 1   Occasional grimace, tearing, frowning, wrinkled forehead  [] 2   Frequent grimace, tearing, frowning, wrinkled forehead    Activity (movement)  [] 0   Lying quietly, normal position  [] 1   Seeking attention through movement or slow, cautious movement  [x] 2   Restless, excessive activity and/or withdrawal reflexes    Guarding  [x] 0   Lying quietly, no positioning of hands over areas of body  [] 1   Splinting areas of the body, tense  [] 2   Rigid, stiff    Physiology (vital signs)  [x] 0   Stable vital signs  [] 1   Change in any of the following: SBP > 20mm Hg; HR > 20/minute  [] 2   Change in any of the following: SBP > 30mm Hg; HR > 25/minute    Respiratory  [x] 0   Baseline RR/SpO2, compliant with ventilator  [] 1   RR > 10 above baseline, or 5% drop SpO2, mild asynchrony with ventilator  [] 2   RR > 20 above baseline, or 10% drop SpO2, asynchrony with ventilator     FUNCTIONAL ASSESSMENT     Palliative Performance Scale (PPS): 10     PSYCHOSOCIAL/SPIRITUAL ASSESSMENT     Active Problems:    Sepsis ([facility] (3/6/2017)      Past Medical History:   Diagnosis Date    Dementia     Hypertension       No past surgical history on file.    Social History   Substance Use Topics    Smoking status: Former Smoker    Smokeless tobacco: Not on file    Alcohol use No     Family History   Problem Relation Age of Onset    Heart Disease Mother     Cancer Father       No Known Allergies   Current Facility-Administered Medications   Medication Dose Route Frequency    morphine injection 2 mg  2 mg IntraVENous Q4H    LORazepam (ATIVAN) injection 1 mg  1 mg IntraVENous Q4H    LORazepam (ATIVAN) injection 0.5 mg  0.5 mg IntraVENous Q15MIN PRN    acetaminophen (TYLENOL) suppository 650 mg  650 mg Rectal Q4H PRN    scopolamine (TRANSDERM-SCOP) 1.5 mg  1.5 mg TransDERmal Q72H PRN    glycopyrrolate (ROBINUL) injection 0.2 mg  0.2 mg IntraVENous Q4H PRN    bisacodyl (DULCOLAX) suppository 10 mg  10 mg Rectal DAILY PRN    morphine injection 2 mg  2 mg IntraVENous Q15MIN PRN        PHYSICAL EXAM     Wt Readings from Last 3 Encounters:   03/05/17 117 lb 11.6 oz (53.4 kg)   12/30/16 114 lb (51.7 kg)   12/23/16 114 lb 13.8 oz (52.1 kg)       Visit Vitals    /45 (BP 1 Location: Right arm, BP Patient Position: At rest)    Pulse 94    Temp 99.2 °F (37.3 °C)    Resp 18    SpO2 95%       Supplemental O2  [] Yes  [x] NO  Last bowel movement:     Currently this patient has:  [x] Peripheral IV [] PICC  [] PORT [] ICD    [x] Amaya Catheter [] NG Tube   [] PEG Tube    [] Rectal Tube [] Drain  [] Other:     Constitutional: pt is laying in bed, eye remain closed, no verbalization, some shaking of arms  Eyes: perrl  ENMT: althea-clear  Cardiovascular: rrr  Respiratory: clear, regular respirations  Gastrointestinal: soft  Musculoskeletal: arms are both tense, slight shaking  Skin: warm, dry, pressure ulcers to heels/ankle covered with dressing and boots.  Sacral ulcers were not viewed  Neurologic: nonresponsive  Psychiatric:   Other:    Pertinent Lab and or Imaging Tests:  Lab Results   Component Value Date/Time    Sodium 140 03/06/2017 04:38 AM    Potassium 5.5 03/06/2017 04:38 AM    Chloride 107 03/06/2017 04:38 AM    CO2 16 03/06/2017 04:38 AM    Anion gap 17 03/06/2017 04:38 AM    Glucose 58 03/06/2017 04:38 AM     03/06/2017 04:38 AM    Creatinine 3.37 03/06/2017 04:38 AM    BUN/Creatinine ratio 31 03/06/2017 04:38 AM    GFR est AA 16 03/06/2017 04:38 AM    GFR est non-AA 13 03/06/2017 04:38 AM    Calcium 7.4 03/06/2017 04:38 AM     Lab Results   Component Value Date/Time    Protein, total 6.0 03/06/2017 04:38 AM    Albumin 1.4 03/06/2017 04:38 AM           Total time: 20 min  Counseling / coordination time:met with daughter, son and family  in room, d/w hospice RN   > 50% counseling / coordination?: no

## 2017-03-10 NOTE — HOSPICE
[facility] LCSW Note: Emotional and social support provided to daughter Gladys Kelley who is sitting with patient. She is coping well. Majority of the family has come and seen patient. Her daughter Lola Mendes who was avoiding coming to the hospital also came to see patient. Gladys Kelley notes that patient has been comfortable and she is very grateful for the care that her mother is getting here. No other needs at this time. Gladys Kelley continues to take breaks and understands that she needs to take care of herself. Patient medications have been increased and she seems more comfortable.

## 2017-03-11 NOTE — HOSPICE
[facility]  [phone]    GIP Daily Nursing Note   Patient Name: Heather Hastings  YOB: 1926  Age: 80 y.o. Date of Visit: 03/11/17  Facility of Care: [facility]  Patient Room: 1112/01     Hospice Attending: Nicanor Mccain MD  Hospice Diagnosis: hospice  Sepsis [facility])    Level of Care: GIP    Current GIP Symptoms    1. Pt remains somewhat restless. She is continuing to move her head and shoulders in an almost rhythmic way like she is uncomfortable. Her breathing has improved since yesterday and is not as labored, however it remains shallow and rapid when she exhibits the head and shoulder movements which leads me to believe she is uncomfortable. She remains unresponsive. ASSESSMENT & PLAN   Must update Plan of Care including visit frequencies for IDT members  1. Pt is more comfortable today, however she is still exhibiting signs of discomfort. Remains GIP at this time for what is presumed to be pain by her restlessness and moving around in her bed. She is also having some leg movement. Medications will be adjusted up to every 2 hours vs every 3 hours. At this time I do not feel the pt needs to have an IV started again or subq medications initiated. Continue to monitor the pt's progression and assess to make sure her symptoms are managed. Spiritual Interventions: None     Psych/ Social/ Emotional Interventions: Provided comfort and active listening to family present     Care Coordination Needs: None at this time     Care plan and New Orders discussed / approved with Dr Lugene Saint MD .    Description History and Chart Review   If this is initial GIP note must document RN assessment/MD communication in previous setting.  Specifically document nursing/medication needs in last 24 hours to support GIP care  Narrative History of last 24 hours that demonstrates care cannot be provided in another setting:  Pt continues to decline, she is unresponsive and is not answering nurse and doesn't appear to be attempting to communicate. She is comforted by touch, however she continues to have head and shoulder movement and occasional leg movement like she cannot get comfortable. She requires medications to be adjusted more frequently due to losing her IV night before last. She is also requiring frequent assessment b/c her symptoms are progressing. What has been done to control the patient's symptoms in the last 24 hours? Increased dose and frequency of medications to make sure she remains comfortable. Does the patient currently require IV medications? No, lost IV access  Does the patient currently require scheduled medications? Yes  Does the patient currently require a PCA? No    List number of doses of PRN medications in last 24 hours:  Medication 1: Scopolamine   Number of doses: 1 application of patch     Medication 2:   Number of doses:    Medication 3:   Number of doses:    Supporting documentation for GIP need for pain control:  [x] Frequent evaluation by a doctor, nurse practitioner, nurse   [x] Frequent medication adjustment    [] IVs that cannot be administered at home   [] Aggressive pain management   [] Complicated technical delivery of medications              Supporting documentation for GIP need for symptom control:  [x]  Sudden decline necessitating intensive nursing intervention  []  Uncontrolled / intractable nausea or vomiting   []  Pathological fractures  []  Advanced open wounds requiring frequent skilled care  [] Unmanageable respiratory distress  [] New or worsening delirium   [] Delirium with behavior issues: Is 24 hour caregiver present due to safety concerns with agitation? (yes/no)  [] Imminent death  with skilled nursing needs documented above    DISCHARGE PLANNING   Daily discharge planning required for GIP  1. Discharge Plan: home with family if symptoms are managed   2. Patient/Family teaching: None  3.  Response to patient/family teaching: ASSESSMENT    KARNOFSKY: 10    Prognosis estimated based on 03/11/17 clinical assessment is:   [] Few to Many Hours  [x] Hours to Days   [] Few to Many Days   [] Days to Weeks   [] Few to Many Weeks   [] Weeks to Months   [] Few to Many Months    Quality Measure: Patient self-reports:  [] Yes    [x] No    ESAS:   Time of Assessment: 1100  Pain (1-10): 3/10  Fatigue (1-10): 5/10  Shortness of breath (1-10): 5/10  Nausea (1-10): 0/10  Appetite (1-10): 0/10  Anxiety: (1-10): 0/10  Depression: (1-10):   Well-being: (1-10):   Constipation: _ Yes  _ No  LAST BM: 3/7/17    CLINICAL INFORMATION   Patient Vitals for the past 12 hrs:   Temp Pulse Resp BP SpO2   03/11/17 0516 98.2 °F (36.8 °C) 70 21 115/43 94 %       Currently this patient has:  [x] Supplemental O2   [] IV    [] PICC      [] PORT   [] NG Tube    [] PEG Tube   [] Ostomy     [x] Amaya draining _amber clear______ urine  [] Other:     SIGNS/PHYSICAL FINDINGS     Skin (including wound):  [] Warm, dry, supple, intact and color normal for race  [x] Warm   [] Dry   [] Cool     [x] Clammy       [] Diaphoretic    Turgor   [] Normal   [x] Decreased  Color:   [] Pink   [] Pale   [] Cyanotic   [] Erythema   [] Jaundice   [x] Normal for Race  []  Wounds:    Neuro:  [x] Lethargy  [] Restlessness / agitation  [] Confusion / delirium  [] Hallucinations  [] Responds to maximal stimulation  [x] Unresponsive  [] Seizures     Cardiac:  [] Dyspnea on Exertion  [] JVD  [] Murmur  [] Palpitations  [] Hypotension  [] Hypertension  [] Tachycardia  [] Bradycardia  [] Irregular HR  [] Pulses Decreased  [] Pulses Absent  [] Edema:       (Location, Grade and Pitting)  [] Mottling:      (Location)    Respiratory:  Breath sounds:    [] Diminished   [] Wheeze   [] Rhonchi   [] Rales   [] Even and unlabored  [x] Labored: only when moving head, shoulder and leg like she is uncomfortable, periodic not all the time  [] Cough   [] Non Productive   [] Productive    [] Description:           [] Deep suctioned   [x] O2 at _2__ LPM PRN  [] High flow oxygen greater than 10 LPM  [] Bi-Pap    GI  [x] Abdomen (describe) Soft, non tender  [] Ascites  [] Nausea  [] Vomiting  [x] Incontinent of bowels  [x] Bowel sounds hypoactive in all 4 quads  [] Diarrhea  [] Constipation (see above including last bowel movement)  [] Checked for impaction  [x] Last BM 3/7/17    Nutrition  Diet:_NPO_________  Appetite:   [] Good   [] Fair   [] Poor   [] Tube Feeding       [] Voiding  [x] Incontinent   [x] Amaya    Musculoskeletal  [] Balance/[place] Unsteady   [x] Weak   Strength:    [] Normal    [] Limited    [x] Decreasing   Activities:    [] Up as tolerated   [x] Bedridden    [] Specify:    SAFETY  [x] 24 hr. Caregiver   [x] Side rails ?     [x] Hospital bed   [x] Reviewed Falls & Safety       ALLERGIES AND MEDICATIONS     Allergies: No Known Allergies    Current Facility-Administered Medications   Medication Dose Route Frequency    LORazepam (INTENSOL) 2 mg/mL oral concentrate 1 mg  1 mg SubLINGual Q3H    morphine (ROXANOL) 100 mg/5 mL (20 mg/mL) concentrated solution 5 mg  5 mg Oral Q3H    LORazepam (INTENSOL) 2 mg/mL oral concentrate 0.5 mg  0.5 mg SubLINGual Q15MIN PRN    morphine (ROXANOL) 100 mg/5 mL (20 mg/mL) concentrated solution 2 mg  2 mg SubLINGual Q15MIN PRN    acetaminophen (TYLENOL) suppository 650 mg  650 mg Rectal Q4H PRN    scopolamine (TRANSDERM-SCOP) 1.5 mg  1.5 mg TransDERmal Q72H PRN    glycopyrrolate (ROBINUL) injection 0.2 mg  0.2 mg IntraVENous Q4H PRN    bisacodyl (DULCOLAX) suppository 10 mg  10 mg Rectal DAILY PRN          Visit Time In: 1100  Visit Time Out: 1200

## 2017-03-11 NOTE — PROGRESS NOTES
Oncology Nursing Communication Tool      7:08 PM  3/11/2017     Bedside shift change report given to SHAQUILLE Vidal (incoming nurse) by Tad Valenzuela RN (outgoing nurse) on Mahendra Daley a 80 y.o. female who was admitted on 3/6/2017  2:18 PM. Report included the following information SBAR, Kardex, Intake/Output, MAR, Accordion and Recent Results. Significant changes during shift: No significant changes; Family still wishes patient not be moved, turned, or repositioned unless necessary. Issues for physician to address: No issues to address at this time            Code Status: DNR     Infections: No current active infections     Allergies: Review of patient's allergies indicates no known allergies. Current diet: DIET ONE TIME MESSAGE       Pain Controlled [x] yes [] no   Bowel Movement [] yes [x] no   Last Bowel Movement (date)     3/6/17              Vital Signs:   No data found. Intake & Output:     Intake/Output Summary (Last 24 hours) at 03/11/17 1833  Last data filed at 03/11/17 1559   Gross per 24 hour   Intake                0 ml   Output              700 ml   Net             -700 ml      Laboratory Results:   No results found for this or any previous visit (from the past 12 hour(s)). Opportunity for questions and clarifications were given to the incoming nurse. Patient's bed is in low position, side rails x2, door open PRN, call bell within reach and patient not in distress.       Tad Valenzuela RN

## 2017-03-11 NOTE — PROGRESS NOTES
1424:  Patients family is at bedside. Family continue to request staff refrain from moving, turning, or repositioning patient unless deemed necessary. Family states the patient \"appears comfortable and without discomfort at this time\".

## 2017-03-11 NOTE — PROGRESS NOTES
Oncology Nursing Communication Tool      7:15 PM  3/10/2017     Bedside shift change report given to Orly Villalobos RN (incoming nurse) by Kobi Davis RN (outgoing nurse) on Margareth Meza a 80 y.o. female who was admitted on 3/6/2017  2:18 PM. Report included the following information SBAR, Kardex, Intake/Output, MAR, Accordion and Recent Results. Significant changes during shift: PRN medications sublingual q3hr ; family has been refusing moving or turning patient unless absolutely necessary       Issues for physician to address: Comfort care            Code Status: DNR     Infections: No current active infections     Allergies: Review of patient's allergies indicates no known allergies. Current diet: DIET ONE TIME MESSAGE       Pain Controlled [x] yes [] no   Bowel Movement [] yes [x] no   Last Bowel Movement (date) 3/6/17              Vital Signs:   No data found. Intake & Output:     Intake/Output Summary (Last 24 hours) at 03/10/17 1915  Last data filed at 03/10/17 1506   Gross per 24 hour   Intake                0 ml   Output             1300 ml   Net            -1300 ml      Laboratory Results:   No results found for this or any previous visit (from the past 12 hour(s)). Opportunity for questions and clarifications were given to the incoming nurse. Patient's bed is in low position, side rails x2, door open PRN, call bell within reach and patient not in distress.       Kobi Davis RN

## 2017-03-11 NOTE — PROGRESS NOTES
1100: Patients family states they do not wish to have patient moved, turned, or repositioned unless absolutely necessary for comfort measures or incontinence hygiene/care.

## 2017-03-11 NOTE — HOSPICE
[address] [place] [address] [facility]  [phone]    Patient Name: Nolberot Carpenter  YOB: 1926    Date of Provider Hospice Visit: 03/11/17    Level of Care:   [x] General Inpatient (GIP)    [] Routine   [] Respite    Location of Care:  [] [facility] [] [facility] [x] [facility] [] [facility] [] [facility]  [] Home [] Other:      Date of Hospice Admission: 3-6-17  Hospice Attending: Dr Thea Louise Diagnosis: Sepsis  Diagnoses RELATED to the terminal prognosis: UTI, acute kidney failure, hyperkalemia, metabolic acidosis  Other Diagnoses: dementia, hypoalbuminemia, debility/bedbound, pressure ulcers      HOSPICE NARRATIVE COMPOSED BY PHYSICIAN   Rationale for a prognosis of life expectancy of 6 months or less if the disease follows its normal course:  Nolberto Carpenter is a 80y.o. year old who was admitted to [facility]. The patient's principle diagnosis of sepsis has resulted in unresponsiveness and hospitalization since 3-5-16. Pt was admitted to [facility] with AMS and rapidly became unresponsive and hypotensive requiring ivf boluses, , iv abx and pressors. Not a dialysis candidate. Pt is now comfort care after discussions with Family and has been weaned off of pressors. Functionally, the patient's Palliative Performance Scale has declined over a period of days and is estimated at 10. Objective information that support this patients limited prognosis includes: pt is nonresponsive. BP 75/40. Wbc 11.2 hb 8.9 Bun/cr 104. 3.3 Potassium 5.5. albumin 1.4  Urine cx--Gram negative rods.     . The patient/family chose comfort measures with the support of Hospice       HOSPICE DIAGNOSES   Active Symptoms:  1. Pain from pressure ulcers  2. restlessness     PLAN   1. Admitted hospice at routine level of care changed to [facility] 3/9 for increase in symptoms and closer monitoring. . Pt is unstable for transport, hemodynamically unstable.   2. Initiate scheduled morphine and ativan today for pain or dyspnea or restlessness    3. Doing well with scheduled meds. 4. Family at bedside. Provided support. 5.  and SW to support family needs  6. Disposition: to home with hospice if condition stabilizes. Pt was cared for at home by her 2 daughters PTA    Prognosis estimated based on 03/11/17 clinical assessment is:   [x] Few to Many Hours  [] Few to Many Days    [] Few to Many Weeks    [] Few to Many Months    Communicated plan of care with: Hospice Case Manager; Hospice IDT; Care Team     GOALS OF CARE     Resuscitation Status: DNR  Durable DNR: [] Yes [] No    Advance Care Planning 3/6/2017   Patient's Healthcare Decision Maker is: Legal Next of Kin   Primary Decision Maker Name -   Primary Decision Maker Phone Number -   Primary Decision Maker Relationship to Patient -   Secondary Decision Maker Name -   Secondary Decision [address] Phone Number -   Secondary Decision Maker Relationship to Patient -   Confirm Advance Directive Yes, on file        HISTORY     History obtained from: chart, hospice RN and son and daughter    CHIEF COMPLAINT:   The patient is:   [] Verbal  [x] Nonverbal  [x] Unresponsive    HPI/SUBJECTIVE:      Still very restless moving shoulders and head back and forth    IV infiltrated this morning.        REVIEW OF SYSTEMS     The following systems were: [] reviewed  [x] unable to be reviewed    Positive ROS include:  Constitutional:  Ears/nose/mouth/throat:  Respiratory:  Gastrointestinal:  Musculoskeletal:  Neurologic:  Psychiatric:  Endocrine:     Adult Non-Verbal Pain Assessment Score: 3    Face  [] 0   No particular expression or smile  [x] 1   Occasional grimace, tearing, frowning, wrinkled forehead  [] 2   Frequent grimace, tearing, frowning, wrinkled forehead    Activity (movement)  [] 0   Lying quietly, normal position  [] 1   Seeking attention through movement or slow, cautious movement  [x] 2   Restless, excessive activity and/or withdrawal reflexes    Guarding  [x] 0   Lying quietly, no positioning of hands over areas of body  [] 1   Splinting areas of the body, tense  [] 2   Rigid, stiff    Physiology (vital signs)  [x] 0   Stable vital signs  [] 1   Change in any of the following: SBP > 20mm Hg; HR > 20/minute  [] 2   Change in any of the following: SBP > 30mm Hg; HR > 25/minute    Respiratory  [x] 0   Baseline RR/SpO2, compliant with ventilator  [] 1   RR > 10 above baseline, or 5% drop SpO2, mild asynchrony with ventilator  [] 2   RR > 20 above baseline, or 10% drop SpO2, asynchrony with ventilator     FUNCTIONAL ASSESSMENT     Palliative Performance Scale (PPS): 10     PSYCHOSOCIAL/SPIRITUAL ASSESSMENT     Active Problems:    Sepsis ([address] (3/6/2017)      Past Medical History:   Diagnosis Date    Dementia     Hypertension       No past surgical history on file.    Social History   Substance Use Topics    Smoking status: Former Smoker    Smokeless tobacco: Not on file    Alcohol use No     Family History   Problem Relation Age of Onset    Heart Disease Mother     Cancer Father       No Known Allergies   Current Facility-Administered Medications   Medication Dose Route Frequency    LORazepam (INTENSOL) 2 mg/mL oral concentrate 1 mg  1 mg SubLINGual Q3H    morphine (ROXANOL) 100 mg/5 mL (20 mg/mL) concentrated solution 5 mg  5 mg Oral Q3H    LORazepam (INTENSOL) 2 mg/mL oral concentrate 0.5 mg  0.5 mg SubLINGual Q15MIN PRN    morphine (ROXANOL) 100 mg/5 mL (20 mg/mL) concentrated solution 2 mg  2 mg SubLINGual Q15MIN PRN    acetaminophen (TYLENOL) suppository 650 mg  650 mg Rectal Q4H PRN    scopolamine (TRANSDERM-SCOP) 1.5 mg  1.5 mg TransDERmal Q72H PRN    glycopyrrolate (ROBINUL) injection 0.2 mg  0.2 mg IntraVENous Q4H PRN    bisacodyl (DULCOLAX) suppository 10 mg  10 mg Rectal DAILY PRN        PHYSICAL EXAM     Wt Readings from Last 3 Encounters:   03/05/17 117 lb 11.6 oz (53.4 kg)   12/30/16 114 lb (51.7 kg)   12/23/16 114 lb 13.8 oz (52.1 kg)       Visit Vitals    /43 (BP 1 Location: Right arm, BP Patient Position: At rest)    Pulse 70    Temp 98.2 °F (36.8 °C)    Resp 21    SpO2 94%       Supplemental O2  [] Yes  [x] NO  Last bowel movement:     Currently this patient has:  [x] Peripheral IV [] PICC  [] PORT [] ICD    [x] Amaya Catheter [] NG Tube   [] PEG Tube    [] Rectal Tube [] Drain  [] Other:     Constitutional: pt is laying in bed, eye remain closed, no verbalization, some shaking of arms  Eyes: perrl  ENMT: althea-clear  Cardiovascular: rrr  Respiratory: clear, regular respirations  Gastrointestinal: soft  Musculoskeletal: arms are both tense, slight shaking  Skin: warm, dry, pressure ulcers to heels/ankle covered with dressing and boots.  Sacral ulcers were not viewed  Neurologic: nonresponsive  Psychiatric:   Other:    Pertinent Lab and or Imaging Tests:  Lab Results   Component Value Date/Time    Sodium 140 03/06/2017 04:38 AM    Potassium 5.5 03/06/2017 04:38 AM    Chloride 107 03/06/2017 04:38 AM    CO2 16 03/06/2017 04:38 AM    Anion gap 17 03/06/2017 04:38 AM    Glucose 58 03/06/2017 04:38 AM     03/06/2017 04:38 AM    Creatinine 3.37 03/06/2017 04:38 AM    BUN/Creatinine ratio 31 03/06/2017 04:38 AM    GFR est AA 16 03/06/2017 04:38 AM    GFR est non-AA 13 03/06/2017 04:38 AM    Calcium 7.4 03/06/2017 04:38 AM     Lab Results   Component Value Date/Time    Protein, total 6.0 03/06/2017 04:38 AM    Albumin 1.4 03/06/2017 04:38 AM           Total time: 20 min  Counseling / coordination time:met with daughter, d/w hospice RN   > 50% counseling / coordination?: no

## 2017-03-12 NOTE — HOSPICE
[facility]  [phone]    Routine Nursing Note   Patient Name: Johny Castellon  YOB: 1926  Age: 80 y.o. Date of Visit: 03/12/17  Facility of Care: [facility]  Patient Room: 1112/01     Hospice Attending: Randa Randle MD  Hospice Diagnosis: hospice  Sepsis [facility])    Level of Care: Routine    ASSESSMENT, PLAN AND INTERVENTIONS     1. Patient changed to Routine Level of Care, pt is currently comfortable on symptom regimen  2. Emotional support to family through prolonged end of life process  3. Pt remains imminent, declining 02 sats, scant UO, no bowel sounds    Spiritual Interventions: ministry of presence by family and staff    Psych/ Social/ Emotional Interventions: family at [place] today, \"white daughter\" Ryan at bedside. Emotional support to Ryan who refers to pt as \"her second mother. \"  Very tearful at times. The two families have been involved with each other for 3 generations. Care Coordination Needs: hospice staff,     Care plan and New Orders discussed / approved with NP Nancy Bishop MD.    Description History and Chart Review     List number of doses of PRN medications in last 24 hours:  Medication 1: ativan  Number of doses:0    Medication 2: morphine  Number of doses:0    Medication 3:   Number of doses:    DISCHARGE PLANNING     1. Discharge Plan: home with hospice should pt stabilize  2. Patient/Family teaching: end of live process  3. Response to patient/family teaching: expressed     ASSESSMENT    KARNOFSKY: 10    Prognosis estimated based on 03/12/17 clinical assessment is:   [] Few to Many Hours  [x] Hours to Days   [] Few to Many Days   [] Days to Weeks   [] Few to Many Weeks   [] Weeks to Months   [] Few to Many Months    Quality Measure: Patient self-reports:  [] Yes    [] No    ESAS:   Time of Assessment: 9:00am  Pain (1-10):3  Fatigue (1-10): 0  Shortness of breath (1-10):2  Nausea (1-10): 0  Appetite (1-10):    Anxiety: (1-10): Depression: (1-10):   Well-being: (1-10):   Constipation: _ Yes  _ No  LAST BM:     CLINICAL INFORMATION   Patient Vitals for the past 12 hrs:   Temp Pulse Resp BP SpO2   03/12/17 0451 96.9 °F (36.1 °C) 88 18 118/49 (!) 86 %       Currently this patient has:  [] Supplemental O2   [] IV    [] PICC      [] PORT   [] NG Tube    [] PEG Tube   [] Ostomy     [x] Amaya draining _______ urine  [] Other:     SIGNS/PHYSICAL FINDINGS     Skin (including wound):  [] Warm, dry, supple, intact and color normal for race  [x] Warm   [] Dry   [] Cool     [] Clammy       [] Diaphoretic    Turgor   [] Normal   [x] Decreased  Color:   [] Pink   [] Pale   [] Cyanotic   [] Erythema   [] Jaundice   [x] Normal for Race  []  Wounds:    Neuro:  [] Lethargy  [] Restlessness / agitation  [] Confusion / delirium  [] Hallucinations  [] Responds to maximal stimulation  [x] Unresponsive  [] Seizures     Cardiac:  [] Dyspnea on Exertion  [] JVD  [] Murmur  [] Palpitations  [] Hypotension  [] Hypertension  [] Tachycardia  [] Bradycardia  [] Irregular HR  [] Pulses Decreased  [] Pulses Absent  [] Edema:       (Location, Grade and Pitting)  [] Mottling:      (Location)    Respiratory:  Breath sounds:    [] Diminished   [] Wheeze   [] Rhonchi   [] Rales   [] Even and unlabored  [] Labored:            [] Cough   [] Non Productive   [] Productive    [] Description:           [] Deep suctioned   [] O2 at ___ LPM  [] High flow oxygen greater than 10 LPM  [] Bi-Pap    GI  [] Abdomen (describe)   [] Ascites  [] Nausea  [] Vomiting  [] Incontinent of bowels  [] Bowel sounds (yes/no)  [] Diarrhea  [] Constipation (see above including last bowel movement)  [] Checked for impaction  [] Last BM     Nutrition  Diet:__NPO________  Appetite:   [] Good   [] Fair   [] Poor   [] Tube Feeding       [] Voiding  [] Incontinent   [x] Amaya    Musculoskeletal  [] Balance/[place] Unsteady   [] Weak   Strength:    [] Normal    [] Limited    [] Decreasing   Activities:    [] Up as tolerated   [x] Bedridden    [] Specify:    SAFETY  [x] 24 hr. Caregiver   [x] Side rails ?     [x] Hospital bed   [] Reviewed Falls & Safety   N/A    ALLERGIES AND MEDICATIONS     Allergies: No Known Allergies    Current Facility-Administered Medications   Medication Dose Route Frequency    LORazepam (INTENSOL) 2 mg/mL oral concentrate 1 mg  1 mg SubLINGual Q3H    morphine (ROXANOL) 100 mg/5 mL (20 mg/mL) concentrated solution 5 mg  5 mg Oral Q3H    LORazepam (INTENSOL) 2 mg/mL oral concentrate 0.5 mg  0.5 mg SubLINGual Q15MIN PRN    morphine (ROXANOL) 100 mg/5 mL (20 mg/mL) concentrated solution 2 mg  2 mg SubLINGual Q15MIN PRN    acetaminophen (TYLENOL) suppository 650 mg  650 mg Rectal Q4H PRN    scopolamine (TRANSDERM-SCOP) 1.5 mg  1.5 mg TransDERmal Q72H PRN    glycopyrrolate (ROBINUL) injection 0.2 mg  0.2 mg IntraVENous Q4H PRN    bisacodyl (DULCOLAX) suppository 10 mg  10 mg Rectal DAILY PRN          Visit Time In: 9:00  Visit Time Out: 10:00

## 2017-03-12 NOTE — PROGRESS NOTES
Oncology Nursing Communication Tool      7:40 PM  3/12/2017     Bedside shift change report given to SHAQUILLE Vidal (incoming nurse) by Azul Garcia RN (outgoing nurse) on Bea Jayesh a 80 y.o. female who was admitted on 3/6/2017  2:18 PM. Report included the following information SBAR, Kardex, Intake/Output, MAR, Accordion and Recent Results. Significant changes during shift: Decreased urinary output of <25mL; Repositioning and turning refused by family      Issues for physician to address: None at this time            Code Status: DNR     Infections: No current active infections     Allergies: Review of patient's allergies indicates no known allergies. Current diet: DIET ONE TIME MESSAGE       Pain Controlled [x] yes [] no   Bowel Movement [] yes [x] no   Last Bowel Movement (date)     3/6/17            Vital Signs:   No data found. Intake & Output:     Intake/Output Summary (Last 24 hours) at 03/12/17 1940  Last data filed at 03/12/17 1553   Gross per 24 hour   Intake                0 ml   Output              825 ml   Net             -825 ml      Laboratory Results:   No results found for this or any previous visit (from the past 12 hour(s)). Opportunity for questions and clarifications were given to the incoming nurse. Patient's bed is in low position, side rails x2, door open PRN, call bell within reach and patient not in distress.       Azul Garcia RN

## 2017-03-12 NOTE — PROGRESS NOTES
Oncology Nursing Communication Tool      7:01 AM  3/12/2017     Bedside shift change report given to Leno Silverman RN (incoming nurse) by Demetra Huerta RN (outgoing nurse) on Margareth Meza a 80 y.o. female who was admitted on 3/6/2017  2:18 PM. Report included the following information SBAR, Kardex, Intake/Output, MAR and Med Rec Status. Significant changes during shift: Pt given complete bath. Remained comfortable throughout shift. Issues for physician to address: none            Code Status: DNR     Infections: No current active infections     Allergies: Review of patient's allergies indicates no known allergies. Current diet: DIET ONE TIME MESSAGE       Pain Controlled [x] yes [] no   Bowel Movement [] yes [x] no   Last Bowel Movement (date)     3/5            Vital Signs:   Patient Vitals for the past 12 hrs:   Temp Pulse Resp BP SpO2   03/12/17 0451 96.9 °F (36.1 °C) 88 18 118/49 (!) 86 %      Intake & Output:     Intake/Output Summary (Last 24 hours) at 03/12/17 0701  Last data filed at 03/12/17 0356   Gross per 24 hour   Intake                0 ml   Output              825 ml   Net             -825 ml      Laboratory Results:   No results found for this or any previous visit (from the past 12 hour(s)). Opportunity for questions and clarifications were given to the incoming nurse. Patient's bed is in low position, side rails x2, door open PRN, call bell within reach and patient not in distress.       Demetra Huerta RN

## 2017-03-13 NOTE — PROGRESS NOTES
Bedside shift change report given to [address] (oncoming nurse) by Juli Eagle RN (offgoing nurse). Report included the following information SBAR and Kardex.

## 2017-03-13 NOTE — PROGRESS NOTES
Oncology Nursing Communication Tool      3:10 PM  3/13/2017     Bedside and Verbal shift change report given to Kelly Franklin RN (incoming nurse) by Lorin Johns RN (outgoing nurse) on Oziel Floor a 80 y.o. female who was admitted on 3/6/2017  2:18 PM. Report included the following information SBAR, Kardex, Intake/Output, MAR and Recent Results. Significant changes during shift: none      Issues for physician to address: none            Code Status: DNR     Infections: No current active infections     Allergies: Review of patient's allergies indicates no known allergies. Current diet: DIET ONE TIME MESSAGE       Pain Controlled [x] yes [] no   Bowel Movement [] yes [x] no   Last Bowel Movement (date)                Vital Signs:   Patient Vitals for the past 12 hrs:   Temp Pulse Resp BP SpO2   03/13/17 0502 96.8 °F (36 °C) 79 16 107/45 (!) 86 %      Intake & Output:     Intake/Output Summary (Last 24 hours) at 03/13/17 1510  Last data filed at 03/13/17 0730   Gross per 24 hour   Intake                0 ml   Output              300 ml   Net             -300 ml      Laboratory Results:   No results found for this or any previous visit (from the past 12 hour(s)). Opportunity for questions and clarifications were given to the incoming nurse. Patient's bed is in low position, side rails x2, door open PRN, call bell within reach and patient not in distress.       Lorin Johns RN

## 2017-03-14 NOTE — HOSPICE
[facility]  [phone]     Routine Nursing Note   Patient Name: Anahi Salgado  YOB: 1926  Age: 80 y.o.     Date of Visit: 03/14/17  [facility]ility of Care: [id number] [facility]  [facility] Attending: Nicanor Mendez MD  Hospice Diagnosis: hospice  Sepsis [facility])     Level of Care: Routine     ASSESSMENT, PLAN AND INTERVENTIONS      1. Patient changed to Routine Level of Care, pt is currently comfortable on symptom regimen  2. Emotional support to family through prolonged end of life process  3. Pt remains imminent, declining 02 sats, scant UO, no bowel sound  4. Begin discussion for discharge as pt is stable. Family has had traumatic deaths at home in the past, \"we can't have her die at home. \"   Spiritual Interventions: ministry of presence by family and staff     Psych/ Social/ Emotional Interventions:  Daughter Sanchez Bower and son Delora Lanes at bedside, Mamadou Mckay in to visit earlier. Family discussing the possibility of pt going home as pt appears to have stabilized. Will continue to discuss.   Care Coordination Needs: hospice staff,      Care plan and New Orders discussed / approved with NP Asaf Oliva MD.     Description History and Chart Review      List number of doses of PRN medications in last 24 hours:  Medication 1: ativan  Number of doses:0     Medication 2: morphine  Number of doses:0     Medication 3:   Number of doses:     DISCHARGE PLANNING      1. Discharge Plan: home with hospice should pt stabilize  2. Patient/Family teaching: end of live process  3.  Response to patient/family teaching: expressed      ASSESSMENT    KARNOFSKY: 10     Prognosis estimated based on 03/12/17 clinical assessment is:   [] Few to Many Hours  [x] Hours to Days   [] Few to Many Days   [] Days to Weeks   [] Few to Many Weeks   [] Weeks to Months   [] Few to Many Months     Quality Measure: Patient self-reports:  [] Yes  [x] No     ESAS:   Time of Assessment: 4:00am  Pain (1-10):3  Fatigue (1-10): 0  Shortness of breath (1-10):2  Nausea (1-10): 0  Appetite (1-10):    Anxiety: (1-10):   Depression: (1-10):   Well-being: (1-10):   Constipation: _ Yes _ No  LAST BM:      CLINICAL INFORMATION   Patient Vitals for the past 12 hrs:  Visit Vitals    /56    Pulse 87    Temp 97.4 °F (36.3 °C)    Resp 16    SpO2 92%           Currently this patient has:  [] Supplemental O2   [] IV    [] PICC     [] PORT   [] NG Tube    [] PEG Tube   [] Ostomy   [x] Amaya draining _______ urine  [] Other:      SIGNS/PHYSICAL FINDINGS      Skin (including wound):  [] Warm, dry, supple, intact and color normal for race  [x] Warm   [] Dry   [] Cool    [] Clammy    [] Diaphoretic   Turgor  [] Normal  [x] Decreased  Color:  [] Pink  [] Pale  [] Cyanotic  [] Erythema  [] Jaundice  [x] Normal for Race  []  Wounds:     Neuro:  [] Lethargy  [] Restlessness / agitation  [] Confusion / delirium  [] Hallucinations  [] Responds to maximal stimulation  [x] Unresponsive  [] Seizures      Cardiac:  [] Dyspnea on Exertion  [] JVD  [] Murmur  [] Palpitations  [] Hypotension  [] Hypertension  [] Tachycardia  [] Bradycardia  [] Irregular HR  [] Pulses Decreased  [] Pulses Absent  [] Edema:  (Location, Grade and Pitting)  [] Mottling: (Location)     Respiratory:  Breath sounds:   [x] Diminished  [] Wheeze  [] Rhonchi  [] Rales   [] Even and unlabored  [] Labored:    [] Cough  [] Non Productive  [] Productive  [] Description:    [] Deep suctioned   [] O2 at ___ LPM  [] High flow oxygen greater than 10 LPM  [] Bi-Pap     GI  [] Abdomen (describe)   [] Ascites  [] Nausea  [] Vomiting  [x] Incontinent of bowels  [] Bowel sounds no)  [] Diarrhea  [] Constipation (see above including last bowel movement)  [] Checked for impaction  [] Last BM      Nutrition  Diet:__NPO________  Appetite:   [] Good   [] Fair   [] Poor   [] Tube Feeding        [] Voiding  [] Incontinent   [x] Amaya     Musculoskeletal  [] Balance/[place] Unsteady [] Weak   Strength:   [] Normal   [] Limited   [] Decreasing   Activities:   [] Up as tolerated  [x] Bedridden   [] Specify:     SAFETY  [x] 24 hr. Caregiver   [x] Side rails ?     [x] Hospital bed   [] Reviewed Falls & Safety N/A     ALLERGIES AND MEDICATIONS      Allergies: No Known Allergies            Current Facility-Administered Medications   Medication Dose Route Frequency    LORazepam (INTENSOL) 2 mg/mL oral concentrate 1 mg 1 mg SubLINGual Q3H    morphine (ROXANOL) 100 mg/5 mL (20 mg/mL) concentrated solution 5 mg 5 mg Oral Q3H    LORazepam (INTENSOL) 2 mg/mL oral concentrate 0.5 mg 0.5 mg SubLINGual Q15MIN PRN    morphine (ROXANOL) 100 mg/5 mL (20 mg/mL) concentrated solution 2 mg 2 mg SubLINGual Q15MIN PRN    acetaminophen (TYLENOL) suppository 650 mg 650 mg Rectal Q4H PRN    scopolamine (TRANSDERM-SCOP) 1.5 mg 1.5 mg TransDERmal Q72H PRN    glycopyrrolate (ROBINUL) injection 0.2 mg 0.2 mg IntraVENous Q4H PRN    bisacodyl (DULCOLAX) suppository 10 mg 10 mg Rectal DAILY PRN             Visit Time In:15:00  Visit Time Out: 16:00

## 2017-03-14 NOTE — HOSPICE
[facility] LCSW Note: Patient appears very comfortable. No signs of distress and breathing appears to be comfortable. Daughters visiting today including Cary Mcrae and Carlo Rodgers. Discussed with family that patient is really holding on and wondered if she is waiting to return to her home, to die. Family noted that they \"don't know\", but that patient has always been somewhat of a strong woman who did things her way. Discussed  arrangements with Cary Mcrae. She noted that the family is trying to make final arrangements but has not done everything yet. \"We are working on it and it would be good if we had a little more time\". All the siblings seem to be together planning .   Kell Fee for the first time today. She was [place], [place] that another daughter who  about four years ago worked at the [employer] and had a disability as a result of a \"box falling on her head\" per Cary Mcrae. She was trying to obtain disability when she . Family has had this loss to grieve. Will explore options for patient leaving the hospital as stable at this time.

## 2017-03-14 NOTE — PROGRESS NOTES
Oncology Nursing Communication Tool      7:29 PM  3/14/2017     Bedside and Verbal shift change report given to [facility] RN (incoming nurse) by Lee Chiang RN (outgoing nurse) on Rachel Cornell a 80 y.o. female who was admitted on 3/6/2017  2:18 PM. Report included the following information SBAR, Kardex, Intake/Output, MAR and Recent Results. Significant changes during shift: none      Issues for physician to address: none            Code Status: DNR     Infections: No current active infections     Allergies: Review of patient's allergies indicates no known allergies. Current diet: DIET ONE TIME MESSAGE       Pain Controlled [x] yes [] no   Bowel Movement [] yes [x] no   Last Bowel Movement (date)             Vital Signs:   No data found. Intake & Output:     Intake/Output Summary (Last 24 hours) at 03/14/17 1929  Last data filed at 03/14/17 1227   Gross per 24 hour   Intake                0 ml   Output              400 ml   Net             -400 ml      Laboratory Results:   No results found for this or any previous visit (from the past 12 hour(s)). Opportunity for questions and clarifications were given to the incoming nurse. Patient's bed is in low position, side rails x2, door open PRN, call bell within reach and patient not in distress.       Lee Chiang RN

## 2017-03-15 NOTE — ROUTINE PROCESS
Oncology Nursing Communication Tool      7:50 PM  3/15/2017     Bedside and Verbal shift change report given to Mago Meek RN (incoming nurse) by Yuriy Cevallos RN (outgoing nurse) on Bea Jayesh a 80 y.o. female who was admitted on 3/6/2017  2:18 PM. Report included the following information SBAR, Kardex, Intake/Output, MAR and Recent Results. Significant changes during shift: none      Issues for physician to address: none            Code Status: DNR     Infections: No current active infections     Allergies: Review of patient's allergies indicates no known allergies. Current diet: DIET ONE TIME MESSAGE       Pain Controlled [x] yes [] no   Bowel Movement [] yes [x] no   Last Bowel Movement (date)               Vital Signs:   No data found. Intake & Output:     Intake/Output Summary (Last 24 hours) at 03/15/17 1950  Last data filed at 03/15/17 1500   Gross per 24 hour   Intake                0 ml   Output              525 ml   Net             -525 ml      Laboratory Results:   No results found for this or any previous visit (from the past 12 hour(s)). Opportunity for questions and clarifications were given to the incoming nurse. Patient's bed is in low position, side rails x2, door open PRN, call bell within reach and patient not in distress.       Yuriy Cevallos RN

## 2017-03-16 NOTE — HOSPICE
The  found the patient lying in bed unresponsive with her daughter at the bedside. The daughter said she was doing ok. She and her family have been taking turns staying with the patient. She participated in life review while the  offered active listening. The daughter and  discussed end--of-life concerns as it relates to her spirituality. The daughter received a call she wanted to take so the  left so she could take the call. The  offered spiritual support through active listening and end-of-life chau discussions. No change in acuity, frequency or care plan.

## 2017-03-16 NOTE — HOSPICE
9:45 am [facility] LCSW Note: Patient seen today, resting comfortably. Breathing appears a bit more labored compared to yesterday. Spoke to her floor RN, Keaton Little regarding her care. Patient has been comfortable. In the room with patient were friends, a gentleman who noted he was a friend of her daughter Cristhian Sanders and another woman. There were also some friends from [employer] who came in to see patient this morning. Hospice means volunteer, Anu Davison came by to sit with patient but did not stay as patient had company. Family in later in the afternoon. No other psychosocial needs at this time.

## 2017-03-16 NOTE — PROGRESS NOTES
Oncology Nursing Communication Tool      7:57 PM  3/16/2017     Bedside shift change report given to Danni Serna RN (incoming nurse) by Mireille Sen RN (outgoing nurse) on Arlene Em a 80 y.o. female who was admitted on 3/6/2017  2:18 PM. Report included the following information SBAR, Kardex, Procedure Summary, Intake/Output, MAR, Accordion, Recent Results and Med Rec Status. Significant changes during shift: None      Issues for physician to address: None            Code Status: DNR     Infections: No current active infections     Allergies: Review of patient's allergies indicates no known allergies. Current diet: DIET ONE TIME MESSAGE       Pain Meds [x] yes [] no   Bowel Movement [] yes [x] no   Last Bowel Movement (date) ? Vital Signs:   No data found. Intake & Output:   No intake or output data in the 24 hours ending 03/16/17 1957   Laboratory Results:   No results found for this or any previous visit (from the past 12 hour(s)). Opportunity for questions and clarifications were given to the incoming nurse. Patient's bed is in low position, side rails x2, door open PRN, call bell within reach and patient not in distress.       Mireille Sen RN

## 2017-03-17 NOTE — HOSPICE
[facility]  [phone]      Routine Nursing Note   Patient Name: Valerie High  YOB: 1926  Age: 80 y.o.      Date of Visit: 17  Facility of Care: [facility]  Patient Room: [address] [place], [place] [address] Attending: Denisa Barrera MD  Hospice Diagnosis: hospice  Sepsis [facility])      Level of Care: Routine      ASSESSMENT, PLAN AND INTERVENTIONS       1. Patient  Routine Level of Care, pt is currently comfortable on symptom regimen  2. Emotional support to family through prolonged end of life process  3. Pt remains imminent, declining 02 sats, scant UO, no bowel sound  4. Begin discussion for discharge as pt is stable. Family has had traumatic deaths at home in the past, \"we can't have her die at home. \"   Spiritual Interventions: ministry of presence by family and staff      Psych/ Social/ Emotional Interventions family continuing to hold means, planning  arrangements, believe this extended decline is to help family adjust to such a great loss. Care Coordination Needs: hospice staff,       Care plan and New Orders discussed / approved with MAYNOR Porter MD.      Description History and Chart Review       List number of doses of PRN medications in last 24 hours:  Medication 1: ativan  Number of doses:0      Medication 2: morphine  Number of doses:0      Medication 3:   Number of doses:      DISCHARGE PLANNING       1. Discharge Plan: home with hospice should pt stabilize  2. Patient/Family teaching: end of live process  3.  Response to patient/family teaching: expressed       ASSESSMENT    KARNOFSKY: 10      Prognosis estimated based on 17 clinical assessment is:   [] Few to Many Hours  [x] Hours to Days   [] Few to Many Days   [] Days to Weeks   [] Few to Many Weeks   [] Weeks to Months   [] Few to Many Months      Quality Measure: Patient self-reports:  [] Yes [x] No      ESAS:   Time of Assessment: 4:00am  Pain (1-10):3  Fatigue (1-10): 0  Shortness of breath (1-10):2  Nausea (1-10): 0  Appetite (1-10):    Anxiety: (1-10):   Depression: (1-10):   Well-being: (1-10):   Constipation: _ Yes _ No  LAST BM:       CLINICAL INFORMATION   Patient Vitals for the past 12 hrs:  Visit Vitals    BP 91/53 (BP 1 Location: Left arm)    Pulse (!) 57    Temp 97.4 °F (36.3 °C)    Resp 16    SpO2 93%              Currently this patient has:  [] Supplemental O2   [] IV   [] PICC   [] PORT   [] NG Tube   [] PEG Tube   [] Ostomy   [x] Amaya draining _______ urine  [] Other:       SIGNS/PHYSICAL FINDINGS       Skin (including wound):  [] Warm, dry, supple, intact and color normal for race  [x] Warm   [] Dry   [] Cool   [] Clammy   [] Diaphoretic   Turgor  [] Normal  [x] Decreased  Color:  [] Pink  [] Pale  [] Cyanotic  [] Erythema  [] Jaundice  [x] Normal for Race  []  Wounds:      Neuro:  [] Lethargy  [] Restlessness / agitation  [] Confusion / delirium  [] Hallucinations  [] Responds to maximal stimulation  [x] Unresponsive  [] Seizures       Cardiac:  [] Dyspnea on Exertion  [] JVD  [] Murmur  [] Palpitations  [] Hypotension  [] Hypertension  [] Tachycardia  [] Bradycardia  [] Irregular HR  [] Pulses Decreased  [] Pulses Absent  [] Edema:  (Location, Grade and Pitting)  [] Mottling: (Location)      Respiratory:  Breath sounds:   [x] Diminished  [] Wheeze  [] Rhonchi  [] Rales   [] Even and unlabored  [] Labored:    [] Cough  [] Non Productive  [] Productive  [] Description:    [] Deep suctioned   [] O2 at ___ LPM  [] High flow oxygen greater than 10 LPM  [] Bi-Pap      GI  [] Abdomen (describe)   [] Ascites  [] Nausea  [] Vomiting  [x] Incontinent of bowels  [] Bowel sounds no)  [] Diarrhea  [] Constipation (see above including last bowel movement)  [] Checked for impaction  [] Last BM       Nutrition  Diet:__NPO________  Appetite:   [] Good   [] Fair   [] Poor   [] Tube Feeding         [] Voiding  [] Incontinent   [x] Amaya      Musculoskeletal  [] Balance/[place] Unsteady   [] Weak Strength:   [] Normal   [] Limited   [] Decreasing   Activities:   [] Up as tolerated  [x] Bedridden   [] Specify:      SAFETY  [x] 24 hr. Caregiver   [x] Side rails ?     [x] Hospital bed   [] Reviewed Falls & Safety N/A      ALLERGIES AND MEDICATIONS       Allergies: No Known Allergies                  Current Facility-Administered Medications   Medication Dose Route Frequency    LORazepam (INTENSOL) 2 mg/mL oral concentrate 1 mg 1 mg SubLINGual Q3H    morphine (ROXANOL) 100 mg/5 mL (20 mg/mL) concentrated solution 5 mg 5 mg Oral Q3H    LORazepam (INTENSOL) 2 mg/mL oral concentrate 0.5 mg 0.5 mg SubLINGual Q15MIN PRN    morphine (ROXANOL) 100 mg/5 mL (20 mg/mL) concentrated solution 2 mg 2 mg SubLINGual Q15MIN PRN    acetaminophen (TYLENOL) suppository 650 mg 650 mg Rectal Q4H PRN    scopolamine (TRANSDERM-SCOP) 1.5 mg 1.5 mg TransDERmal Q72H PRN    glycopyrrolate (ROBINUL) injection 0.2 mg 0.2 mg IntraVENous Q4H PRN    bisacodyl (DULCOLAX) suppository 10 mg 10 mg Rectal DAILY PRN                Visit Time In:4:00  Visit Time Out: 5:00

## 2017-03-17 NOTE — PROGRESS NOTES
The patient's family continues to adamantly refuse attempts to turn and/or reposition the patient. Education provided to the patient's family regarding pressure ulcer prevention and treatment as the patient has wounds documented prior to arrival and is at risk for further skin breakdown due to NPO status, immobilily, and approaching end of life. The patient's family verbalized understanding of teaching provided. Will continue to reinforce.

## 2017-03-17 NOTE — PROGRESS NOTES
Oncology Nursing Communication Tool      7:03 AM  3/17/2017     Bedside and Verbal shift change report given to Lorene Ocampo RN (incoming nurse) by Cathy Anand RN (outgoing nurse) on Trellis Roof a 80 y.o. female who was admitted on 3/6/2017  2:18 PM. Report included the following information SBAR, Kardex, Procedure Summary, Intake/Output, MAR, Accordion, Recent Results and Med Rec Status. Significant changes during shift: none      Issues for physician to address: none            Code Status: DNR     Infections: No current active infections     Allergies: Review of patient's allergies indicates no known allergies. Current diet: DIET ONE TIME MESSAGE       Pain Controlled [] yes [] no   Bowel Movement [] yes [] no   Last Bowel Movement (date)                 Vital Signs:   Patient Vitals for the past 12 hrs:   Temp Pulse Resp BP SpO2   03/17/17 0506 97.3 °F (36.3 °C) 74 16 (!) 94/32 90 %      Intake & Output:     Intake/Output Summary (Last 24 hours) at 03/17/17 0703  Last data filed at 03/17/17 0344   Gross per 24 hour   Intake                0 ml   Output               20 ml   Net              -20 ml      Laboratory Results:   No results found for this or any previous visit (from the past 12 hour(s)). Opportunity for questions and clarifications were given to the incoming nurse. Patient's bed is in low position, side rails x2, door open PRN, call bell within reach and patient not in distress.       Cathy Anand RN

## 2017-03-17 NOTE — PROGRESS NOTES
Oncology Nursing Communication Tool      7:01 PM  3/17/2017     Bedside shift change report given to Maggy Iyer RN (incoming nurse) by Diana Sanders RN (outgoing nurse) on Johny Castellon a 80 y.o. female who was admitted on 3/6/2017  2:18 PM. Report included the following information SBAR, Kardex, Intake/Output, MAR, Accordion and Recent Results. Significant changes during shift: Family refused to have patient turned/repositioned today. Education provided to family on purpose of turning and repositioning as well as further wound deterioration. Family verbalized understand of education provided. Increased respirations throughout the day. Issues for physician to address: Increased respirations throughout the day. Code Status: DNR     Infections: No current active infections     Allergies: Review of patient's allergies indicates no known allergies. Current diet: DIET ONE TIME MESSAGE       Pain Controlled [x] yes [] no   Bowel Movement [] yes [x] no   Last Bowel Movement (date)     3/6/17            Vital Signs:   Patient Vitals for the past 12 hrs:   Resp   03/17/17 1208 19   03/17/17 0907 23      Intake & Output:     Intake/Output Summary (Last 24 hours) at 03/17/17 1901  Last data filed at 03/17/17 1127   Gross per 24 hour   Intake                0 ml   Output              145 ml   Net             -145 ml      Laboratory Results:   No results found for this or any previous visit (from the past 12 hour(s)). Opportunity for questions and clarifications were given to the incoming nurse. Patient's bed is in low position, side rails x2, door open PRN, call bell within reach and patient not in distress.       Diana Sanders RN

## 2017-03-17 NOTE — HOSPICE
Follow up hospice RN visit after family refused for scheduled meds to be given.     RR 30.     Met with family of daughter and a friend.  Support given   Advised those present that patient is showing signs of distress with grimacing with movement of arm and with RR up.      Reminded that medications are scheduled and that at times as needed meds will be given as ordered.     Understanding expressed.     Discussed with unit nurse Kaden Arriaza RN

## 2017-03-17 NOTE — HOSPICE
[facility]    ___X___   Routine Visit  Is this a change in level of care? __X_ No                                                        ____Yes: notified physician and received     VITAL SIGNS  P ____88___ R __32_____ B/P __94/32_____   Temp 97 (PRN):________________    Weight __________              MAC _______ cm    Skin: __X_Warm __X_ Dry      Karnofsky    10               CARDIOVASCULAR  General Color: ____________     X_AP  _X__ Reg   ___ Irregular   _X__Pedal Pulses VERY WEAK  ___Ascites   _X__Edema PEDAL EDEMA 3 PLUS AND PITTING Comments:     SENSORY IMPAIRED  ___Speech   ___Vision   ___Hearing  NEURO  _X__Altered LOC Key for Scales (select one of the following)          _X__LOC 4 = no response   _RESPIRATORY  ___Clear   __X_Crackles UPPER AIRWAYS  ___ Diminished   ___R L Bilateral   ___Labored   ___Wheezes   ___Cough   ___Rhonchi   ___Sputum color. ______________  ___Short of Breath with exertion   ___Apnea   ___Oxygen _______ L/M     Continuous/PRN NC/Mask   ___Using as directed  GASTROINTESTINAL  ___Nausea   ___Vomiting   ___Dysphasia   ___Diarrhea   __X_Abdomen soft  ROUNDED WITHOUT BOWEL SOUNDS  ___ [place]  Diet:__NPO_______    GENITOURINARY  ___Voiding without difficulty   ___ Incontinent   ___ Frequency   ___Dysuria   ___Distention   ___Retention   ___Oliguria   ___Hesitancy  _X__Catheter Amaya     size: _________     Date Change:  _____________     Urine Color:   __DARK EDITH CLEAR___________  MUSCULOSKELETAL  ___Balance/Ames Unsteady   ___Weak     Strength:   ___Normal   ___Limited   _X__Decreasing     Activities:   ___Up as tolerated  _X__Bedridden   ___Specify  SAFETY  ___24 hr. Caregiver   __X_Side rails ?     __X_Hospital bed   __X_Reviewed Falls & Safety PER HOSPITAL POLICES    Precautions   ___Oxygen Safety assessment   ___Oxygen Safety teaching   ___Medications labeled appropriately   ___ Medication teaching   ___Meds assessed for refills     COPING  Patient ___Coping Well ___ Anger   ___Denial   ___Depressed   ___ Good Support    Caregiver   ___Coping Well   ___ Anger   ___Denial   ___Depressed ___ Good Support      Wound Assessment:   1. Location -     Type of wound; describe size color, odor, drainage, etc.   NUMEROUS DRESSINGS DRY AND INTACT          Symptom Scale    Please CHECK number that best describes symptom level:     No pain 0 1 2X 3 4 5 6 7 8 9 10 Worst possible pain   Yes     No   No shortness of breath 0 1 2 3 4X 5 6 7 8 9 10 Worst possible shortness of breath                    Yes    No   Acceptable level of SOB 0 1 2 3 4 5 6 7 8 9 10       Time of Symptom Assessment ____0845____________     Is patient / family satisfied with Symptom Level? [place]   Yes or No.  Family not present  Will check back. Completed by   _X_ Clinician Alone    Pain Assessment  1st Location: _____ASSESSED TO BE GENERALIZED AS WITH FACIAL GRIMACING DURING REPOSITIONING FOR ASSESSMENT___________________  Frequency: __PT UNABLE TO ANSWER QUESTIONS____________________    NARRATIVE:   Pt lying on right side unresponsive except for facial grimacing during movement for assessment. RR 32 and quick. Crackles heard upper airways   Pain and dyspnea assessed in spite of Morphine 5mg and Lorazepam 1 mg both po q 3 hours scheduled. Will ask unit nurse to administer prn doses of Lorazepam and Morphine and will reassess.      Phone call to: ______________________ Time: ____________ Order Received ____ Yes   _____ No        Time In: _0830______ Time Out: __0910______

## 2017-03-17 NOTE — HOSPICE
[facility] LCSW Note: Met with daughter, Renée Ayala (who also goes by Margo Bills) who was keeping means with a friend. Son Isiah Chilel also came in later. Family coping well, no psychosocial needs at this time. Patient's respirations are more rapid, family aware patient is close to the end. Support provided to them.

## 2017-03-18 NOTE — HOSPICE
[facility]  [phone]    Discharge/Death Nursing Note   Patient Name: Arlene Em  YOB: 1926  Age: 80 y.o. Date of Death: 17  Admitted Date: 3/6/2017  Time of Death: [facility] Care:[facility]  Level of Care: Routine  Patient Room: [address] [place] [address]     Hospice Attending: Guicho Heller MD  Hospice Diagnosis: hospice  Sepsis [facility])    Death Pronouncement   Pronouncement of death completed by: Dr. Nesha Tyson staff was not present at the time of death    At the time of death the patient was documented as pulseless, no breath sounds, no response to stimuli    The pt  within [facility]    The following were notified of the patient's death: daughter and son  Medications were disposed of per facility protocol     Discharge Summary   Discharge Reason: Death    Summary of Care Provided:    [x] Post mortem care provided by nursing staff at [facility]  [x] Notification of  home by by nursing staff at [facility]  [x] Referrals/Community resources provided:   [] Goals completed  [] Durable Medical Equipment vendor notified     Disciplines involved: [x] RN [] SW [x]  [] SÁNCHEZ [] Vol [] PT [] OT [] ST [] [facility]    [x] IDT communication/notification    Attending Physician, Dr. 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      Advance Care Planning 3/6/2017   Patient's Healthcare Decision Maker is: Legal Next of Kin   Primary Decision Maker Name -   Primary Decision Maker Phone Number -   Primary Decision Maker Relationship to Patient -   Secondary Decision Maker Name -   Secondary Decision [address] Phone Number -   Secondary Decision Maker Relationship to Patient -   Confirm Advance Directive Yes, on file

## 2017-03-18 NOTE — PROGRESS NOTES
Death Declaration        Pt Name  Milena Arnold date:  3/6/2017   Date and time of death:  3/18/2017 @ 1250    Room Number  1112/01    Medical Record Number  [id number] @ [facility]   Age  80 y.o. Date of Birth 5/24/1926   PCP Nelida Huynh MD   Attending physician Willy Smith MD      Code Status  DNR    Patient seen and examined     Mental status   Unresponsive    Pupils Dilated and Fixed    Respiration Nil    Pulse  Absent     Heart Sounds  Absent    Rhythm     Family  At bedside    Chaplan Service  Notified by Nursing staff     Death certificate and discharge summary completion remain  Dr. Willy Smith MD's responsibility.    Nelia Gomez MD MPH  FACP                               3/18/2017

## 2017-03-18 NOTE — PROGRESS NOTES
0751:  Patient in bed with labored breathing pattern at 36 breaths per minute:  Wet lung sounds with secretions. PRN atropine administered with scheduled morphine and lorazepam.  HOB elevated and suction applied to patients mouth. 50mL thick white secretions suctioned at bedside. Will continue to assess. 1109: Suction applied to patients mouth to remove excess secretions. Tolerated well; Respirations 38;  Wet lung sounds

## 2017-03-18 NOTE — PROGRESS NOTES
Oncology Nursing Communication Tool      6:56 AM  3/18/2017     Bedside and Verbal shift change report given to Mary Lou Forbes and SHAQUILLE Up (incoming nurse) by Steve Lee RN (outgoing nurse) on Arlene Em a 80 y.o. female who was admitted on 3/6/2017  2:18 PM. Report included the following information SBAR, Kardex, ED Summary, Procedure Summary, Intake/Output, MAR, Accordion, Recent Results and Med Rec Status. Significant changes during shift: respirations WNL overnight, no family at bedside this shift. Issues for physician to address: none            Code Status: DNR     Infections: No current active infections     Allergies: Review of patient's allergies indicates no known allergies. Current diet: DIET ONE TIME MESSAGE       Pain Controlled [] yes [] no   Bowel Movement [] yes [] no   Last Bowel Movement (date)                 Vital Signs:   Patient Vitals for the past 12 hrs:   Temp Pulse Resp BP SpO2   03/18/17 0503 98.2 °F (36.8 °C) 86 20 (!) 77/41 (!) 89 %      Intake & Output:     Intake/Output Summary (Last 24 hours) at 03/18/17 0656  Last data filed at 03/17/17 2003   Gross per 24 hour   Intake                0 ml   Output              150 ml   Net             -150 ml      Laboratory Results:   No results found for this or any previous visit (from the past 12 hour(s)). Opportunity for questions and clarifications were given to the incoming nurse. Patient's bed is in low position, side rails x2, door open PRN, call bell within reach and patient not in distress.       Steve Lee RN

## 2017-03-18 NOTE — PROGRESS NOTES
Entered patient's room for rounds; patient did not appear to be breathing. No heart or lung sounds heard on auscultation and no carotid pulses felt on palpation. Pupils fixed and non-reactive; no response to noxious stimuli. Dual verification provided by Jillian Hayes RN. Family notified by phone; stated they would come to the hospital shortly. 1300:  Nursing supervisor Grazyna Khalil notified of patient's passing; hospital  paged. 1308:  returned page and has been made aware of patient's passing and that the family is en route. 1330:  [facility] notified of [facility]'s death by Dorian Daugherty RN. Per [facility], the patient is not suitable for organ, tissue, or eye donation due to age and admitting diagnosis. She has been approved for release to the  home. 1345:  Family arrived;  is at the bedside providing support. 1400: The patient's son approached this nurse and stated that the [facility] had [facility] [address] [place] [address] instead of [facility] as previously stated and noted on the chart. 1730:  Family has left the bedside. 1800:  Post mortem care is complete. Patient is contracted into a semi fetal position; this was her baseline. Nursing supervisor made aware as the shroud will have to be unzipped approximately senior living up to reach the toe tag for identification. Dual verification of patient's identity provided by Dorian Daugherty RN; wristband, toe tag, and tag attached to the outside of the shroud match. The patient's paper chart is with the patient. 1845: Patient put in for transport to the [facility].

## 2017-03-18 NOTE — PROGRESS NOTES
Responded to page to Oncology when patient . Provided bereavement support when family arrived. Two members of family are ministers. Family is well supported spiritually. Offered condolences and assurance of prayer.     RANDALL Bueno, [facility], [address] [place] [address] [address]     Paging Service  [phone]

## 2017-03-19 PROCEDURE — 0651 HSPC ROUTINE HOME CARE

## 2017-03-20 ENCOUNTER — HOME CARE VISIT (OUTPATIENT)
Dept: HOSPICE | Facility: HOSPICE | Age: 82
End: 2017-03-20
Payer: MEDICARE

## 2017-03-21 NOTE — DISCHARGE SUMMARY
Discharge Summary    [facility]  [facility]        Date of Admission: 3/6/2017  Date of Discharge: 3/18/2017    Mary Sheldon is a 80y.o. year old who was admitted to [facility] at [facility] with a Hospice diagnosis of hospice; Sepsis ([facility] [address] The patient's care was focused on comfort and the patient passed away on 3/18/2017.

## 2022-09-28 NOTE — H&P
[address] [place] [address] [facility]  [phone]    Patient Name: Eduardo Rouse  YOB: 1926    Date of Provider Hospice Visit: 03/06/17    Level of Care:   [] General Inpatient (GIP)    [x] Routine   [] Respite    Location of Care:  [] [facility] [] [facility] [x] [facility] [] [facility] [] Hospice [facility]  [] Home [] Other:      Date of Hospice Admission: 3-6-17  Hospice Attending: Dr Bertha Chacon Diagnosis: Sepsis  Diagnoses RELATED to the terminal prognosis: UTI, acute kidney failure, hyperkalemia, metabolic acidosis  Other Diagnoses: dementia, hypoalbuminemia, debility/bedbound, pressure ulcers      HOSPICE NARRATIVE COMPOSED BY PHYSICIAN   Rationale for a prognosis of life expectancy of 6 months or less if the disease follows its normal course:  Eduardo Rouse is a 80y.o. year old who was admitted to [facility]. The patient's principle diagnosis of sepsis has resulted in unresponsiveness and hospitalization since 2-5-16. Pt was admitted to [facility] with AMS and rapidly became unresponsive and hypotensive requiring ivf boluses, , iv abx and pressors. Not a dialysis candidate. Pt is now comfort care after discussions with Family and has been weaned off of pressors. Functionally, the patient's Palliative Performance Scale has declined over a period of days and is estimated at 10. Objective information that support this patients limited prognosis includes: pt is nonresponsive. BP 75/40. Wbc 11.2 hb 8.9 Bun/cr 104. 3.3 Potassium 5.5. albumin 1.4  Urine cx--Gram negative rods.     . The patient/family chose comfort measures with the support of Hospice       HOSPICE DIAGNOSES   Active Symptoms:  1. Pain from pressure ulcers  2. restlessness     PLAN   1. Admit hospice at routine level of care/comfort bed. Pt is unstable for transport, hemodynamically unstable. 2. Medicate as need for pain or dyspnea or restlessness    3.  and SW to support family needs  4.  Disposition: to home Addended by: Siddharth Mims on: 9/28/2022 08:27 AM     Modules accepted: Orders with hospice if condition stabilizes. Pt was cared for at home by her 2 daughters PTA    Prognosis estimated based on 03/06/17 clinical assessment is:   [x] Few to Many Hours  [] Few to Many Days    [] Few to Many Weeks    [] Few to Many Months    Communicated plan of care with: Hospice Case Manager; Hospice IDT; Care Team     GOALS OF CARE     Resuscitation Status: DNR  Durable DNR: [] Yes [] No    Advance Care Planning 3/6/2017   Patient's Healthcare Decision Maker is: Legal Next of Kin   Primary Decision Maker Name -   Primary Decision Maker Phone Number -   Primary Decision Maker Relationship to Patient -   Secondary Decision Maker Name -   Secondary Decision [address] Phone Number -   Secondary Decision Maker Relationship to Patient -   Confirm Advance Directive Yes, on file        HISTORY     History obtained from: chart, hospice RN and son and daughter    CHIEF COMPLAINT:   The patient is:   [] Verbal  [x] Nonverbal  [x] Unresponsive    HPI/SUBJECTIVE:      Has appeared uncomfortable and more restless since transfer to oncology floor  Mediated with iv morphine for gramacing.        REVIEW OF SYSTEMS     The following systems were: [] reviewed  [x] unable to be reviewed    Positive ROS include:  Constitutional:  Ears/nose/mouth/throat:  Respiratory:  Gastrointestinal:  Musculoskeletal:  Neurologic:  Psychiatric:  Endocrine:     Adult Non-Verbal Pain Assessment Score: 3    Face  [] 0   No particular expression or smile  [x] 1   Occasional grimace, tearing, frowning, wrinkled forehead  [] 2   Frequent grimace, tearing, frowning, wrinkled forehead    Activity (movement)  [] 0   Lying quietly, normal position  [] 1   Seeking attention through movement or slow, cautious movement  [x] 2   Restless, excessive activity and/or withdrawal reflexes    Guarding  [x] 0   Lying quietly, no positioning of hands over areas of body  [] 1   Splinting areas of the body, tense  [] 2   Rigid, stiff    Physiology (vital signs)  [x] 0 Stable vital signs  [] 1   Change in any of the following: SBP > 20mm Hg; HR > 20/minute  [] 2   Change in any of the following: SBP > 30mm Hg; HR > 25/minute    Respiratory  [x] 0   Baseline RR/SpO2, compliant with ventilator  [] 1   RR > 10 above baseline, or 5% drop SpO2, mild asynchrony with ventilator  [] 2   RR > 20 above baseline, or 10% drop SpO2, asynchrony with ventilator     FUNCTIONAL ASSESSMENT     Palliative Performance Scale (PPS): 10     PSYCHOSOCIAL/SPIRITUAL ASSESSMENT     Active Problems:    Sepsis ([address] (3/6/2017)      Past Medical History:   Diagnosis Date    Dementia     Hypertension       No past surgical history on file.    Social History   Substance Use Topics    Smoking status: Former Smoker    Smokeless tobacco: Not on file    Alcohol use No     Family History   Problem Relation Age of Onset    Heart Disease Mother     Cancer Father       No Known Allergies   Current Facility-Administered Medications   Medication Dose Route Frequency    LORazepam (ATIVAN) injection 0.5 mg  0.5 mg IntraVENous Q15MIN PRN    acetaminophen (TYLENOL) suppository 650 mg  650 mg Rectal Q4H PRN    scopolamine (TRANSDERM-SCOP) 1.5 mg  1.5 mg TransDERmal Q72H PRN    glycopyrrolate (ROBINUL) injection 0.2 mg  0.2 mg IntraVENous Q4H PRN    bisacodyl (DULCOLAX) suppository 10 mg  10 mg Rectal DAILY PRN    morphine injection 2 mg  2 mg IntraVENous Q15MIN PRN        PHYSICAL EXAM     Wt Readings from Last 3 Encounters:   03/05/17 117 lb 11.6 oz (53.4 kg)   12/30/16 114 lb (51.7 kg)   12/23/16 114 lb 13.8 oz (52.1 kg)       Visit Vitals    BP 95/40 (BP Patient Position: At rest)    Pulse 90    Temp 98.2 °F (36.8 °C)    Resp 24    SpO2 92%       Supplemental O2  [] Yes  [x] NO  Last bowel movement:     Currently this patient has:  [x] Peripheral IV [] PICC  [] PORT [] ICD    [x] Amaya Catheter [] NG Tube   [] PEG Tube    [] Rectal Tube [] Drain  [] Other:     Constitutional: pt is laying in bed, eye remain closed, no verbalization, some shaking of arms  Eyes: perrl  ENMT: althea-clear  Cardiovascular: rrr  Respiratory: clear, regular respirations  Gastrointestinal: soft  Musculoskeletal: arms are both tense, slight shaking  Skin: warm, dry, pressure ulcers to heels/ankle covered with dressing and boots.  Sacral ulcers were not viewed  Neurologic: nonresponsive  Psychiatric:   Other:    Pertinent Lab and or Imaging Tests:  Lab Results   Component Value Date/Time    Sodium 140 03/06/2017 04:38 AM    Potassium 5.5 03/06/2017 04:38 AM    Chloride 107 03/06/2017 04:38 AM    CO2 16 03/06/2017 04:38 AM    Anion gap 17 03/06/2017 04:38 AM    Glucose 58 03/06/2017 04:38 AM     03/06/2017 04:38 AM    Creatinine 3.37 03/06/2017 04:38 AM    BUN/Creatinine ratio 31 03/06/2017 04:38 AM    GFR est AA 16 03/06/2017 04:38 AM    GFR est non-AA 13 03/06/2017 04:38 AM    Calcium 7.4 03/06/2017 04:38 AM     Lab Results   Component Value Date/Time    Protein, total 6.0 03/06/2017 04:38 AM    Albumin 1.4 03/06/2017 04:38 AM           Total time: 40 min  Counseling / coordination time:met with son and daughter in room, d/w hospice RN   > 50% counseling / coordination?: no